# Patient Record
Sex: FEMALE | Race: WHITE | NOT HISPANIC OR LATINO | Employment: OTHER
[De-identification: names, ages, dates, MRNs, and addresses within clinical notes are randomized per-mention and may not be internally consistent; named-entity substitution may affect disease eponyms.]

---

## 2018-05-18 ENCOUNTER — TRANSCRIBE ORDERS (OUTPATIENT)
Dept: SCHEDULING | Age: 68
End: 2018-05-18

## 2018-05-18 DIAGNOSIS — M54.12 BRACHIAL NEURITIS: Primary | ICD-10-CM

## 2018-05-19 ENCOUNTER — HOSPITAL ENCOUNTER (OUTPATIENT)
Dept: RADIOLOGY | Facility: HOSPITAL | Age: 68
Discharge: HOME | End: 2018-05-19
Attending: PHYSICAL MEDICINE & REHABILITATION
Payer: MEDICARE

## 2018-05-19 DIAGNOSIS — M54.12 BRACHIAL NEURITIS: ICD-10-CM

## 2018-07-16 ENCOUNTER — TRANSCRIBE ORDERS (OUTPATIENT)
Dept: SCHEDULING | Facility: REHABILITATION | Age: 68
End: 2018-07-16

## 2018-07-16 DIAGNOSIS — M54.2 CERVICALGIA: Primary | ICD-10-CM

## 2018-07-18 ENCOUNTER — HOSPITAL ENCOUNTER (OUTPATIENT)
Dept: PHYSICAL THERAPY | Facility: REHABILITATION | Age: 68
Setting detail: THERAPIES SERIES
Discharge: HOME | End: 2018-07-18
Attending: PHYSICAL MEDICINE & REHABILITATION
Payer: MEDICARE

## 2018-07-18 VITALS — DIASTOLIC BLOOD PRESSURE: 81 MMHG | HEART RATE: 76 BPM | SYSTOLIC BLOOD PRESSURE: 145 MMHG

## 2018-07-18 DIAGNOSIS — M54.2 CERVICALGIA: ICD-10-CM

## 2018-07-18 PROCEDURE — 97162 PT EVAL MOD COMPLEX 30 MIN: CPT | Mod: GP

## 2018-07-18 PROCEDURE — G8982 BODY POS GOAL STATUS: HCPCS | Mod: GP,CJ

## 2018-07-18 PROCEDURE — G8981 BODY POS CURRENT STATUS: HCPCS | Mod: GP,CK

## 2018-07-18 RX ORDER — AMITRIPTYLINE HYDROCHLORIDE 10 MG/1
10 TABLET, FILM COATED ORAL NIGHTLY
COMMUNITY
End: 2020-12-27

## 2018-07-18 RX ORDER — ASPIRIN 325 MG
325 TABLET ORAL DAILY
COMMUNITY
End: 2018-10-24 | Stop reason: ALTCHOICE
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
## 2018-07-25 ENCOUNTER — HOSPITAL ENCOUNTER (OUTPATIENT)
Dept: PHYSICAL THERAPY | Facility: REHABILITATION | Age: 68
Setting detail: THERAPIES SERIES
Discharge: HOME | End: 2018-07-25
Attending: PHYSICAL MEDICINE & REHABILITATION
Payer: MEDICARE

## 2018-07-25 DIAGNOSIS — M54.2 CERVICALGIA: Primary | ICD-10-CM

## 2018-07-25 PROCEDURE — 97112 NEUROMUSCULAR REEDUCATION: CPT | Mod: GP

## 2018-07-25 PROCEDURE — 97530 THERAPEUTIC ACTIVITIES: CPT | Mod: GP

## 2018-07-26 NOTE — PROGRESS NOTES
Patient: Ema Black  Location: [facility]    MRN: [id number]  Today's date: 7/25/2018          Therapy Pain/Vitals - 07/25/18 2000        Pain/Comfort/Sleep    Presence of Pain complains of pain/discomfort    Preferred Pain Scale number (Numeric Rating Pain Scale)    Pain Body Location - Side Right    Pain Body Location Neck, upper trapezius    Pain Rating (0-10): Rest --   2    Pain Rating (0-10): Activity 2                    PT Treatment Summary - 07/25/18 1600        Session Details    Document Type daily treatment    Mode of Treatment individual therapy;physical therapy    Patient/Family Observations Pt eports generalized soreness after having to look to the right for a long time. Pt reports going to modifed yoga class yesterday.        Time Calculation    Start Time 1600    Stop Time 1700    Time Calculation (min) 60 min       Range of Motion (ROM)    Comment, General Range of Motion Passive C/S ROM w emphasis on rotation and L lat flexion. STM R upper trapezius, levator scap, supraspinatus, paraspinals       Manual Muscle Testing (MMT)    Comment C/S flex endurance: 30 sec; ext endurance: 30 sec        Core Strengthening Therapeutic Exercise    Comment C/S retract 2x5 w tactile cues, cat/dog x 5; neutral spine in Qped; alt shoulder flexion 5B, upper trunk rot/thread the needle 3xB in Qped; c/s rot w towel 3x 10 sec B        UE Tyler Eliminated Therapeutic Exercise    Exercise(s) Performed shoulder external rotation    Sets/Reps Detail 2x10, 1#    Comment scap depression/ret before Er; tactile cues       UE Supine Therapeutic Exercise    Exercise(s) Performed shoulder external rotation    Sets/Reps Detail 2x10    Comment B ER w OTB       Aerobic Exercise Activity    Exercise(s) Performed arm bike    Time 3    Comment alt dir q min       PT Weekly Outcome Summary    Weekly Outcome Statement Tx emphasis on increasing c/s ROM, activating scapular depressors and maintaining  neutral spine in supine and Qped. HEP issued and reviewed for S/L ER and B ER w OT in supine. Pt is pleasand and receptive to suggestions but speaks incessantly and requires redirection to task. No pain after tx completed.    Recommendations Progress UBE and q ped TE.                         Education provided this session: HEP issued and reviewed for S/L ER and B ER w OT in supine.

## 2018-07-30 ENCOUNTER — HOSPITAL ENCOUNTER (OUTPATIENT)
Dept: PHYSICAL THERAPY | Facility: REHABILITATION | Age: 68
Setting detail: THERAPIES SERIES
Discharge: HOME | End: 2018-07-30
Attending: PHYSICAL MEDICINE & REHABILITATION
Payer: MEDICARE

## 2018-07-30 DIAGNOSIS — M54.2 CERVICALGIA: Primary | ICD-10-CM

## 2018-07-30 PROCEDURE — 97112 NEUROMUSCULAR REEDUCATION: CPT | Mod: GP

## 2018-07-30 PROCEDURE — 97110 THERAPEUTIC EXERCISES: CPT | Mod: GP

## 2018-07-31 NOTE — PROGRESS NOTES
"Patient: Ema Black  Location: [facility]    MRN: [id number]  Today's date: 7/30/2018                PT Treatment Summary - 07/30/18 1300        Session Details    Document Type daily treatment    Mode of Treatment individual therapy;physical therapy    Patient/Family Observations Pt reports neck and B hip tightness after sitting in folding lawn chair for concert in the park x 2 hours. \"I was a little sore after the last visit. I expect that.\" Pt denies UE paresthesias.        Time Calculation    Start Time 1300    Stop Time 1350    Time Calculation (min) 50 min       Range of Motion (ROM)    Comment, General Range of Motion PROM w emphasis on endrange mobility; gentle STM<8 min to limit compensatory overuse.       UE [place] Eliminated Therapeutic Exercise    Exercise(s) Performed shoulder external rotation    Sets/Reps Detail 2x10 1#    Comment scap depression/retraction before ER       UE Supine Therapeutic Exercise    Exercise(s) Performed shoulder external rotation    Sets/Reps Detail 2x15    Comment B ER w PTB; PNF D1/2 OTB 10B        Aerobic Exercise Activity    Exercise(s) Performed arm bike    Time 5    Comment alt dir q min       PT Weekly Outcome Summary    Weekly Outcome Statement Pt requires near constant redirection due to speaking incessantly. Tx emphasis on activatin scapular depressors versus scapular elevators. Demonstrates midrange PNF in supine with fair demo. Needs additional reinforcement for form and maintaining C/S retraction. Vague report of feeling 'ok' after tx. Pain level requested. Pt deferred. \"I am ok..\"    Recommendations Abbreviated tx 2/2 pt going to job fair. When requiring <= min cues, issue PNF patterns. D1/2.                         Education provided this session: activating scapular depressors/muscle re-education in supine.        "

## 2018-08-06 ENCOUNTER — HOSPITAL ENCOUNTER (OUTPATIENT)
Dept: PHYSICAL THERAPY | Facility: REHABILITATION | Age: 68
Setting detail: THERAPIES SERIES
Discharge: HOME | End: 2018-08-06
Attending: PHYSICAL MEDICINE & REHABILITATION
Payer: MEDICARE

## 2018-08-06 DIAGNOSIS — M54.2 CERVICALGIA: Primary | ICD-10-CM

## 2018-08-06 PROCEDURE — 97140 MANUAL THERAPY 1/> REGIONS: CPT | Mod: GP

## 2018-08-06 PROCEDURE — 97112 NEUROMUSCULAR REEDUCATION: CPT | Mod: GP

## 2018-08-06 NOTE — PROGRESS NOTES
"Patient: Ema Black  Location: [facility]    MRN: [id number]  Today's date: 8/6/2018          Therapy Pain/Vitals - 08/06/18 1400        Pain/Comfort/Sleep    Presence of Pain complains of pain/discomfort    Preferred Pain Scale number (Numeric Rating Pain Scale)    Pain Body Location - Side Right    Pain Body Location neck    Pain Rating (0-10): Rest --   0    Pain Rating (0-10): Activity --   0 now; R sided neck pain over weekend.                    PT Treatment Summary - 08/06/18 1300        Session Details    Document Type daily treatment    Mode of Treatment individual therapy;physical therapy    Patient/Family Observations Pt reports increased neck pain for a few days after last visit but is now able to brush hair and back up in car without difficulty. Denies neck pain at present. \"I feel stiff.\"       Time Calculation    Start Time 1305    Stop Time 1405    Time Calculation (min) 60 min       ROM: Neck    AROM Deficit: Left Neck Rotation 65    AROM Deficit: Right Neck Rotation 64       UE Electra Eliminated Therapeutic Exercise    Sets/Reps Detail 10    Comment T/S rotation in sidelyin w eyes following hand        UE Seated Therapeutic Exercise    Comment T/S ext stretch 3x w P T overpressure; 5x over chair w hands behind neck (midrange only); UT stretch 3x 10 sec B       UE Standing Therapeutic Exercise    Comment Leaning against wall in modified squat, TrA w B ER OTB  w neutral head position 2x5       UE Supine Therapeutic Exercise    Exercise(s) Performed shoulder external rotation    Comment On noodle, 2 towels under arms, B ER w PTB 2x10; same TE w TrA 10B; D1/2 2x10 B UE no resistance        Aerobic Exercise Activity    Exercise(s) Performed arm bike    Time 5   5:30    Comment L1 alt dir q min       PT Weekly Outcome Summary    Weekly Outcome Statement Improved demo of PNF D1/2, full range, without difficulty in supine. Firm endfeel noted during passive C/S rot and " "lateral flexion. Trial 1/2 foam roll (can use pool noodle or towels at home) to increase extension. Tx emphasis on engaging core in supine and standing while maintaining neutral spine and depression scapular stabilizers. Pt is motivated and agreeable to all suggestions. 4 degree inc in C/S rotation yet patient reports a \"really big difference\". Pt reports R UT pain 2-3/10 after tx completed.    Recommendations Issue HEP and review next visit.                         Education provided this session: engaging core in multiple planes while doing other exercises. Will benefit from reinforcement.        "

## 2018-08-13 ENCOUNTER — HOSPITAL ENCOUNTER (OUTPATIENT)
Dept: PHYSICAL THERAPY | Facility: REHABILITATION | Age: 68
Setting detail: THERAPIES SERIES
Discharge: HOME | End: 2018-08-13
Attending: PHYSICAL MEDICINE & REHABILITATION
Payer: MEDICARE

## 2018-08-13 DIAGNOSIS — M54.2 CERVICALGIA: Primary | ICD-10-CM

## 2018-08-13 PROCEDURE — 97112 NEUROMUSCULAR REEDUCATION: CPT | Mod: GP

## 2018-08-13 PROCEDURE — 97110 THERAPEUTIC EXERCISES: CPT | Mod: GP

## 2018-08-13 PROCEDURE — 97140 MANUAL THERAPY 1/> REGIONS: CPT | Mod: GP

## 2018-08-13 NOTE — PROGRESS NOTES
"Patient: Ema Black  Location: [facility]    MRN: [id number]  Today's date: 8/13/2018 08/13/18 1500   Pain/Comfort/Sleep   Presence of Pain complains of pain/discomfort   Preferred Pain Scale number (Numeric Rating Pain Scale)   Pain Body Location - Side Right   Pain Body Location neck   Pain Rating (0-10): Rest 5   Pain Rating (0-10): Activity 5               PT Treatment Summary - 08/13/18 1300        Session Details    Document Type daily treatment    Mode of Treatment individual therapy;physical therapy    Patient/Family Observations \"I did too much this weekend again. I cleaned the carpets with . Neck pain is not improving but radiating symptoms have dissipated.\"       Time Calculation    Start Time 1300    Stop Time 1400    Time Calculation (min) 60 min       Range of Motion (ROM)    Comment, General Range of Motion STM B UT, posterior neck; PROM neck, seated T/S ext mobilization 5x;        Core Strengthening Therapeutic Exercise    Comment C/S retraction 10 w 3 sec hold; TrA w mini squat B ER OTB 2x10;  UT stretcch 3x20 sec B;        UE [place] Eliminated Therapeutic Exercise    Sets/Reps Detail 10    Comment T/S rotation w eyes following hand 1#       UE Seated Therapeutic Exercise    Comment T/S ext stretch 3x w Pt assist; 5x over chair w hands behind neck (midrange only); UT stretch 3x 10 sec B       UE Standing Therapeutic Exercise    Exercise(s) Performed shoulder extension    Sets/Reps Detail 2x10 w OTB    Comment Overhead pull down,B w band on uppermost nail on Tband tree       Aerobic Exercise Activity    Exercise(s) Performed arm bike    Time 8    Comment L1 alt dir q 2 min       PT Weekly Outcome Summary    Weekly Outcome Statement 2/10 neck pain. Min vc's for form with ther ex to activate scapular stabilizers versus using UT's. HEP issued and reviewed. Sustained C/S ext 30 sec; sustained c/s flex 15 seconds.     Recommendations Review Qped " shoulder flexion, birddog, B shoulder ext w OTB, as well as B shoulder ext pull down.                   Education provided this session: HEP prone shoulder ext 3x/wk, prone horiz abd 3xwk, qped shoulder flex 2x/wk, Qped birddog 2x/wk

## 2018-09-17 ENCOUNTER — HOSPITAL ENCOUNTER (OUTPATIENT)
Dept: PHYSICAL THERAPY | Facility: REHABILITATION | Age: 68
Setting detail: THERAPIES SERIES
Discharge: HOME | End: 2018-09-17
Attending: PHYSICAL MEDICINE & REHABILITATION
Payer: MEDICARE

## 2018-09-17 DIAGNOSIS — M54.2 CERVICALGIA: Primary | ICD-10-CM

## 2018-09-17 PROCEDURE — 97110 THERAPEUTIC EXERCISES: CPT | Mod: GP

## 2018-09-17 PROCEDURE — 97140 MANUAL THERAPY 1/> REGIONS: CPT | Mod: GP

## 2018-09-18 NOTE — PROGRESS NOTES
"Patient: Ema Black  Location: [facility]    MRN: [id number]  Today's date: 9/17/2018          Therapy Pain/Vitals - 09/17/18 1800        Pain/Comfort/Sleep    Presence of Pain complains of pain/discomfort    Preferred Pain Scale number (Numeric Rating Pain Scale)    Pain Body Location - Side Right    Pain Body Location neck    Pain Rating (0-10): Rest 5    Pain Rating (0-10): Activity 5          Prior Living Environment            Prior Level of Function              PT Treatment Summary - 09/17/18 1800        Session Details    Document Type re-evaluation    Mode of Treatment individual therapy;physical therapy    Patient/Family Observations NDI: 28% impaired. \"I was doing pretty good until this weekend. It was very busy weekend with family events (new baby, wedding, ill family member).  I have been doing my exercises and I can feel myself using muscles I never used before.\"       Time Calculation    Start Time 1800    Stop Time 1900    Time Calculation (min) 60 min       Range of Motion (ROM)    Comment, General Range of Motion PROM C/S, T/S PA mob G2-3; C/S G2 PA; T/S ext mobilization 3x       ROM: Neck    AROM Deficit: Neck Flexion 60    AROM Deficit: Neck Extension 63    AROM Deficit: Left Neck Lateral Flexion 20    AROM Deficit: Right Neck Lateral Flexion 36    AROM Deficit: Left Neck Rotation 67    AROM Deficit: Right Neck Rotation 72       ROM: Left Shoulder    AROM Deficit: Flexion 160    PROM Deficit: Flexion 170    AROM Deficit: Extension 70    PROM Deficit: Extension 80    AROM Deficit: ABduction 162    PROM Deficit: ABduction 180    AROM Deficit: External Rotation 80    PROM Deficit: External Rotation 90    AROM Deficit: Internal Rotation --   T9       ROM: Right Shoulder    AROM Deficit: Flexion 167    PROM Deficit: Flexion 180    AROM Deficit: Extension 80    PROM Deficit: Extension --   WFL    AROM Deficit: ABduction 155    PROM Deficit: ABduction 170    AROM " Deficit: External Rotation 84    AROM Deficit: Internal Rotation T8       Manual Muscle Testing (MMT)    Comment Sustained: C/S ext 30 sec, flex 23 sec       Core Strengthening Therapeutic Exercise    Comment supine on 1/2 roll B shoulder flexion stretch 5x 30 sec; S/L T/S rot w horizontal shoulder abd 10B; BRIAN w c/s ext 5; BRIAN w C/S rot 5B;        Aerobic Exercise Activity    Exercise(s) Performed arm bike    Time 5    Comment L1 alt dir q min       PT Clinical Impression    Plan For Care Reviewed: Physical Therapy patient voices agreement with PT plan for care    System Pathology/Pathophysiology Noted musculoskeletal    Functional Limitations in Following Categories (PT Eval) work;community/leisure    Disability: Inability to Perform Actions/Activities of Required Roles work;community/leisure    Rehab Potential/Prognosis good, to achieve stated therapy goals    PT Frequency of Treatment --   1x/wk    Estimated Length of Stay 3 weeks    Daily Outcome Statement Ema has progressed well despite interruption in tx due to work schedule/therapist availability. Improved C/S ROM, improved activity tolerance, improved NDI, and decreased pain intensity and frequency. Pt reports partially return to function. Pt ganga benefit from continuing PT to meet unmet goals. Pt is agge       PT Weekly Outcome Summary    Functional Goal Overall Progress progressing toward functional goals as expected                        Education provided this session: pt encouraged to purchase foam noodle from [employer].         Goals     • PT STG/LTG Neck            Long Term Goals Time Frame Result Comment/Progress   Pt will increase cervical ROM >/= 10 degrees into cervical flexion, extension, lateral flexion, rotation 4 weeks partially met Met for flex, ext, R lat flex, R rot   Pt will demonstrate cervical extensor and flexor endurance > = 30 seconds without symptoms 8 weeks partially met Met for ext   Pt will improve NDI<=30% impairment 4  weeks met 28% impaired   Pt will increase cervical ROM >/= 15 from initial eval degrees into cervical flexion, extension, lateral flexion, rotation 8 weeks partially met    Pt will improve NDI </= 25 % impairment  8 weeks ongoing    Pt will have centralization of radicular symptoms by >/= 100%  8 weeks ongoing Referred pain R UT, intermittent   Pt will report proper ergonomic set up of workstation to minimize difficulty when using a mouse 8 weeks met    Pt will report increase driving tolerance >=30 minutes without onset of R UT spasms 8 weeks met

## 2018-09-24 ENCOUNTER — HOSPITAL ENCOUNTER (OUTPATIENT)
Dept: PHYSICAL THERAPY | Facility: REHABILITATION | Age: 68
Setting detail: THERAPIES SERIES
Discharge: HOME | End: 2018-09-24
Attending: PHYSICAL MEDICINE & REHABILITATION
Payer: MEDICARE

## 2018-09-24 DIAGNOSIS — M54.2 CERVICALGIA: Primary | ICD-10-CM

## 2018-09-24 PROCEDURE — 97140 MANUAL THERAPY 1/> REGIONS: CPT | Mod: GP

## 2018-09-24 PROCEDURE — 97112 NEUROMUSCULAR REEDUCATION: CPT | Mod: GP

## 2018-09-24 NOTE — PROGRESS NOTES
Patient: Ema Black  Location: [facility]    MRN: [id number]  Today's date: 9/24/2018          Therapy Pain/Vitals - 09/24/18 1800        Pain/Comfort/Sleep    Presence of Pain denies                    PT Treatment Summary - 09/24/18 1800        Range of Motion (ROM)    Comment, General Range of Motion PROM C/S; STM B UT, SOR, scalenes, levator, masseter, hyoid; PA mob G3 T1-12.        Core Strengthening Therapeutic Exercise    Comment supine w towel horizontal T4 ; S/L upper T/S rot 10B; S/L lower T/S rot stretch w 1 LE extended 30 sec B; BRIAN w c/s ext 10; BRIAN C/S rot 3x B; seated SCM stretch 3x 20 sec B       Aerobic Exercise Activity    Exercise(s) Performed arm bike    Time 5    Comment L1 alt dir q min       PT Weekly Outcome Summary    Functional Goal Overall Progress progressing toward functional goals as expected    Weekly Outcome Statement Tx emphasis on minimizing neck muscle tension and adaptive shortening w STM well as increasing active C/S and T/S mobility. Good demo of all activies with need for reinforcement of seated SCM stretch. No c/o pain or fatigue after tx completed.    Recommendations Cont 1/2 kneel T/S AROM, BRIAN w c/s ext, and SCM stretch. Send HEP via email. May benefit from being seen by Sherin Crane PT to address longstanding TMD issues.                         Education provided this session: new stretches to address spinal mobility including 1/2 kneel T/S rotation, BRIAN w c/s ext, and SCM stretch.Will benefit from reinforcement via HEP or short video on phone to ensure appropriate form during activities.

## 2018-10-01 ENCOUNTER — HOSPITAL ENCOUNTER (OUTPATIENT)
Dept: PHYSICAL THERAPY | Facility: REHABILITATION | Age: 68
Setting detail: THERAPIES SERIES
Discharge: HOME | End: 2018-10-01
Attending: PHYSICAL MEDICINE & REHABILITATION
Payer: MEDICARE

## 2018-10-01 DIAGNOSIS — M54.2 CERVICALGIA: Primary | ICD-10-CM

## 2018-10-01 PROCEDURE — 97112 NEUROMUSCULAR REEDUCATION: CPT | Mod: GP

## 2018-10-01 PROCEDURE — 97140 MANUAL THERAPY 1/> REGIONS: CPT | Mod: GP

## 2018-10-02 ENCOUNTER — TRANSCRIBE ORDERS (OUTPATIENT)
Dept: SCHEDULING | Facility: REHABILITATION | Age: 68
End: 2018-10-02

## 2018-10-02 DIAGNOSIS — S03.00XA TMJ (DISLOCATION OF TEMPOROMANDIBULAR JOINT): Primary | ICD-10-CM

## 2018-10-02 NOTE — PROGRESS NOTES
"Patient: Ema Black  Location: [facility]    MRN: [id number]  Today's date: 10/1/2018        Prior Living Environment            Prior Level of Function              PT Treatment Summary - 10/01/18 1800        Session Details    Document Type daily treatment    Mode of Treatment physical therapy;individual therapy    Patient/Family Observations Pt reports feeling good today. \"I woke up feeling terrible. I went back to sleep and then had a decent day.\" Pt reports not working. She is between temp jobs.        Time Calculation    Start Time 1800    Stop Time 1900    Time Calculation (min) 60 min       Range of Motion (ROM)    Comment, General Range of Motion PROM C/S; STM B UT, SO, HYOID, SO, UT; seated T/S ext mobilization 5x       Core Strengthening Therapeutic Exercise    Comment  S/L upper T/S rot 10B; 2# S/L lower T/S rot stretch w 1 LE extended 30 sec B; BRIAN w c/s ext 10;; seated SCM stretch 3x20 sec; tall kneel to 1/2 kneel 5B;        UE Prone Therapeutic Exercise    Comment B prone horiz abd, B prone scaption 2x10 (HEP)       Aerobic Exercise Activity    Exercise(s) Performed arm bike    Time 7    Comment L1 alt dir q min       PT Weekly Outcome Summary    Functional Goal Overall Progress progressing toward functional goals as expected    Weekly Outcome Statement Tx emphasis on ccontinuing exercise as instructed to improve mobility and strength. Pt denies pain tx completed.                         Education provided this session: Pt encouraged to cont yoga and add current exercises to maintain or improve current mobility. Pt verbalized understanding but will benefit from HEP to reinforce.       "

## 2018-10-08 ENCOUNTER — HOSPITAL ENCOUNTER (OUTPATIENT)
Dept: PHYSICAL THERAPY | Facility: REHABILITATION | Age: 68
Setting detail: THERAPIES SERIES
Discharge: HOME | End: 2018-10-08
Attending: PHYSICAL MEDICINE & REHABILITATION
Payer: MEDICARE

## 2018-10-08 DIAGNOSIS — M54.2 CERVICALGIA: Primary | ICD-10-CM

## 2018-10-08 PROCEDURE — 97140 MANUAL THERAPY 1/> REGIONS: CPT | Mod: GP

## 2018-10-08 PROCEDURE — 97110 THERAPEUTIC EXERCISES: CPT | Mod: GP

## 2018-10-09 NOTE — PROGRESS NOTES
Patient: Ema Black  Location: [facility]    MRN: [id number]  Today's date: 10/8/2018          Therapy Pain/Vitals - 10/08/18 1700        Pain/Comfort/Sleep    Presence of Pain complains of pain/discomfort    Preferred Pain Scale number (Numeric Rating Pain Scale)    Pain Body Location - Side Right    Pain Body Location - Orientation posterior    Pain Body Location neck    Pain Rating (0-10): Rest 4                PT Treatment Summary - 10/08/18 1700        Session Details    Document Type daily treatment    Mode of Treatment individual therapy;physical therapy    Patient/Family Observations Pain radiates from R UT to top of head. No jaw pain. Pt attempted to wean from Amitriptyline last week and pain worsened terribly. (+) migraines and inability to sleep. Resumed taking Amitriptyline as prescribed.        Time Calculation    Start Time 1704    Stop Time 1800    Time Calculation (min) 56 min       Range of Motion (ROM)    Comment, General Range of Motion PROM C/S; STM B UT, HYOID, SO, SCALENES, LEVATOR; PA mobilization  G3 T/3; S/L scapular mobilization in all planes; IASTM R UT        Core Strengthening Therapeutic Exercise    Comment BRIAN, C/S ext x 10; TPR w tennis ball/pecs in doorway; C/S rot w towel 3x10 sec; qped cat/dog 10; BRIAN w C/S ext 5       UE Prone Therapeutic Exercise    Sets/Reps Detail 2x10    Comment B shoulder horiz a b       Aerobic Exercise Activity    Exercise(s) Performed arm bike    Time 6    Comment alt dir q min       PT Weekly Outcome Summary    Functional Goal Overall Progress progressing toward functional goals as expected    Weekly Outcome Statement (+) R UT spasm improved with trigger point noted proximal to adhesion in R UT muscle belly. R scapula inf angle protracted compared to L. HEP issued and reviewed w fair demo. Dec pain after tx completed.     Recommendations Review HEP (C/S ext while BRIAN, sidelying horiz shoulder abd/add, qped thread the  needle).                         Education provided this session. See the Patient Education summary report for full details.       Goals     None

## 2018-10-10 ENCOUNTER — HOSPITAL ENCOUNTER (OUTPATIENT)
Dept: PHYSICAL THERAPY | Facility: REHABILITATION | Age: 68
Setting detail: THERAPIES SERIES
Discharge: HOME | End: 2018-10-10
Attending: DENTIST
Payer: MEDICARE

## 2018-10-10 DIAGNOSIS — S03.00XA TMJ (DISLOCATION OF TEMPOROMANDIBULAR JOINT): ICD-10-CM

## 2018-10-10 DIAGNOSIS — S03.00XA DISLOCATION OF TEMPOROMANDIBULAR JOINT, INITIAL ENCOUNTER: ICD-10-CM

## 2018-10-10 PROCEDURE — 97162 PT EVAL MOD COMPLEX 30 MIN: CPT | Mod: GP

## 2018-10-10 PROCEDURE — G8982 BODY POS GOAL STATUS: HCPCS | Mod: GP,CI

## 2018-10-10 PROCEDURE — G8981 BODY POS CURRENT STATUS: HCPCS | Mod: GP,CJ
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
## 2018-10-11 PROCEDURE — G8982 BODY POS GOAL STATUS: HCPCS | Mod: GP,CI

## 2018-10-11 PROCEDURE — G8981 BODY POS CURRENT STATUS: HCPCS | Mod: GP,CJ
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
## 2018-10-15 ENCOUNTER — HOSPITAL ENCOUNTER (OUTPATIENT)
Dept: PHYSICAL THERAPY | Facility: REHABILITATION | Age: 68
Setting detail: THERAPIES SERIES
Discharge: HOME | End: 2018-10-15
Attending: PHYSICAL MEDICINE & REHABILITATION
Payer: MEDICARE

## 2018-10-15 DIAGNOSIS — M54.2 CERVICALGIA: Primary | ICD-10-CM

## 2018-10-17 NOTE — PROGRESS NOTES
"Referring Provider: By co-signing this Plan of Care (POC), you agree with the planned services and interventions recommended by the therapist.       PT RE-EVALUATION FOR DISCHARGE/OUTPATIENT THERAPY    Patient: Ema Black   MRN: [id number]  : 1950 68 y.o.  Referring Physician: Fidencio Rivers MD  Date of Visit: 10/16/18      Diagnosis:   1. Cervicalgia        Chief Complaints:   Chief Complaint   Patient presents with   • Dec ROM     C/S, T/S   • Decreased Endurance     2/2 upper spine tightness   • Pain      R UT and neck pain                 PT Treatment Summary - 10/15/18 1700        Session Details    Document Type discharge evaluation    Mode of Treatment individual therapy;physical therapy    Patient/Family Observations NDI: 28% impaired. \"I have a spasm in my right shoulder.\"  Bad HA's in the last few weeks. Transiert. No HA now.        Time Calculation    Start Time 1700    Stop Time 1800    Time Calculation (min) 60 min       General Information    Patient Profile Reviewed? yes       Range of Motion (ROM)    Comment, General Range of Motion PROM C/S; PA mob G3 T/S; STM  R UT, levator, rhomboids, SS/IS       ROM: Neck    AROM Deficit: Neck Flexion 43   dec 17 from last assessment    AROM Deficit: Neck Extension 58   dec 5 since last reassessment    AROM Deficit: Left Neck Lateral Flexion 25   inc 5 degrees    AROM Deficit: Right Neck Lateral Flexion --    AROM Deficit: Left Neck Rotation 66   dec 1 degree    AROM Deficit: Right Neck Rotation 68   inc 14 degrees       Core Strengthening Therapeutic Exercise    Comment C/S ret 10 x 5 sec; C/S flex 30 sec; C/S ext in BRIAN 10; cat/cow 10; birddog 5B; Qped thread the needle 5B; C/S rot w towel 3x w 10 sec hold, B; C/S ext w towel roll 3x5 sec        UE Prone Therapeutic Exercise    Sets/Reps Detail 2x10    Comment prone horiz abd, prone scap        Aerobic Exercise Activity    Exercise(s) Performed arm bike    Time 7    Comment L1, retro       PT " Clinical Impression    Plan For Care Reviewed: Physical Therapy patient voices agreement with PT plan for care    System Pathology/Pathophysiology Noted musculoskeletal    Daily Outcome Statement Ema reports variable intensity and frequency of neck pain, R UT pain, and HA's. She is independent and compliant w current HEP's. (+) TTP R UT with possible lipoma. Pt reports feeling better after doing stretches and manual techniques. Limited T/S mobility T2-12. s       PT Weekly Outcome Summary    Functional Goal Overall Progress progressing toward functional goals slower than expected                   Education provided this session. See the Patient Education summary report for full detail      Goals:  Goals Addressed     • PT STG/LTG Neck                  Long Term Goals Time Frame Result Comment/Progress   Pt will increase cervical ROM >/= 10 degrees into cervical flexion, extension, lateral flexion, rotation 4 weeks partially met    Pt will demonstrate cervical extensor and flexor endurance > = 30 seconds without symptoms 8 weeks met    Pt will improve NDI<=30% impairment 4 weeks met 28% impaired   Pt will increase cervical ROM >/= 15 from initial eval degrees into cervical flexion, extension, lateral flexion, rotation 8 weeks partially met    Pt will improve NDI </= 25 % impairment  8 weeks unmet 28% impaired   Pt will have centralization of radicular symptoms by >/= 100%  8 weeks unmet Referred pain R UT, intermittent   Pt will report proper ergonomic set up of workstation to minimize difficulty when using a mouse 8 weeks met    Pt will report increase driving tolerance >=30 minutes without onset of R UT spasms 8 weeks met                        Plan: Discharge skilled PT fo neck. Cont current HEP.

## 2018-10-19 ENCOUNTER — HOSPITAL ENCOUNTER (OUTPATIENT)
Dept: PHYSICAL THERAPY | Facility: REHABILITATION | Age: 68
Setting detail: THERAPIES SERIES
Discharge: HOME | End: 2018-10-19
Attending: PHYSICAL MEDICINE & REHABILITATION
Payer: MEDICARE

## 2018-10-19 DIAGNOSIS — M54.2 CERVICALGIA: Primary | ICD-10-CM

## 2018-10-19 DIAGNOSIS — S03.00XA DISLOCATION OF TEMPOROMANDIBULAR JOINT, INITIAL ENCOUNTER: ICD-10-CM

## 2018-10-19 PROCEDURE — 97010 HOT OR COLD PACKS THERAPY: CPT | Mod: GP

## 2018-10-19 PROCEDURE — 97112 NEUROMUSCULAR REEDUCATION: CPT | Mod: GP

## 2018-10-19 PROCEDURE — 97140 MANUAL THERAPY 1/> REGIONS: CPT | Mod: GP

## 2018-10-19 NOTE — PROGRESS NOTES
Patient: Ema Black  Location: [facility]    MRN: [id number]  Today's date: 10/19/2018          Therapy Pain/Vitals - 10/19/18 1100        Pain/Comfort/Sleep    Pain Charting Type Pain Assessment    Presence of Pain complains of pain/discomfort    Preferred Pain Scale number (Numeric Rating Pain Scale)    Pain Body Location - Side Right    Pain Body Location - Orientation upper    Pain Rating (0-10): Rest 3    Pain Rating (0-10): Activity 2          Prior Living Environment            Prior Level of Function              PT Treatment Summary - 10/19/18 1100        Session Details    Document Type daily treatment    Mode of Treatment individual therapy;physical therapy    Patient/Family Observations 1st tx       Time Calculation    Start Time 1105    Stop Time 1200    Time Calculation (min) 55 min       Neuromuscular Re-education    Comment TMJ kinematics in mirror: dental midlines> L and R lateral excursion, guided opening with tongue on rugae - decreasing deviation to either side; discussed postural re-ed       General Head/Neck/Trunk Assessment    Comment, ROM: Head/Neck/Trunk Man tx: STM To B SO's and scal/SCM's, UT's, internal mom, infrahyoids and suprahyoids       UE Seated Therapeutic Exercise    Comment seated C/s rotation with gentle passive OP, added hand behind back, B       Hot Packs Treatment    Comment MHP c/s x 10 min       PT Weekly Outcome Summary    Weekly Outcome Statement init tx today.  Tight L>R internal mom, as well as UT areas.    Recommendations assess response to tx          Pain Assessment/Intervention  Pain Charting Type: Pain Assessment             Education provided this session. Posture re-ed, TMJ kinematics in mirror.  Goals     None

## 2018-10-24 ENCOUNTER — APPOINTMENT (EMERGENCY)
Dept: RADIOLOGY | Facility: HOSPITAL | Age: 68
End: 2018-10-24
Payer: MEDICARE

## 2018-10-24 ENCOUNTER — HOSPITAL ENCOUNTER (EMERGENCY)
Facility: HOSPITAL | Age: 68
Discharge: HOME | End: 2018-10-24
Attending: EMERGENCY MEDICINE
Payer: MEDICARE

## 2018-10-24 VITALS
RESPIRATION RATE: 18 BRPM | TEMPERATURE: 97.3 F | WEIGHT: 158 LBS | BODY MASS INDEX: 26.33 KG/M2 | HEIGHT: 65 IN | DIASTOLIC BLOOD PRESSURE: 73 MMHG | OXYGEN SATURATION: 97 % | SYSTOLIC BLOOD PRESSURE: 162 MMHG | HEART RATE: 66 BPM

## 2018-10-24 DIAGNOSIS — R51.9 ACUTE NONINTRACTABLE HEADACHE, UNSPECIFIED HEADACHE TYPE: ICD-10-CM

## 2018-10-24 DIAGNOSIS — R20.2 PARESTHESIAS: Primary | ICD-10-CM

## 2018-10-24 LAB
ALBUMIN SERPL-MCNC: 4.2 G/DL (ref 3.4–5)
ALP SERPL-CCNC: 72 IU/L (ref 35–126)
ALT SERPL-CCNC: 23 IU/L (ref 11–54)
ANION GAP SERPL CALC-SCNC: 8 MEQ/L (ref 3–15)
AST SERPL-CCNC: 24 IU/L (ref 15–41)
BASOPHILS # BLD: 0.06 K/UL (ref 0.01–0.1)
BASOPHILS NFR BLD: 1.1 %
BILIRUB SERPL-MCNC: 0.4 MG/DL (ref 0.3–1.2)
BUN SERPL-MCNC: 16 MG/DL (ref 8–20)
CALCIUM SERPL-MCNC: 8.9 MG/DL (ref 8.9–10.3)
CHLORIDE SERPL-SCNC: 105 MEQ/L (ref 98–109)
CO2 SERPL-SCNC: 23 MEQ/L (ref 22–32)
CREAT SERPL-MCNC: 0.7 MG/DL (ref 0.6–1.1)
DIFFERENTIAL METHOD BLD: NORMAL
EOSINOPHIL # BLD: 0.11 K/UL (ref 0.04–0.36)
EOSINOPHIL NFR BLD: 1.9 %
ERYTHROCYTE [DISTWIDTH] IN BLOOD BY AUTOMATED COUNT: 17 % (ref 11.7–14.4)
GFR SERPL CREATININE-BSD FRML MDRD: >60 ML/MIN/1.73M*2
GLUCOSE SERPL-MCNC: 95 MG/DL (ref 70–99)
HCT VFR BLDCO AUTO: 42.3 % (ref 35–45)
HGB BLD-MCNC: 13.6 G/DL (ref 11.8–15.7)
IMM GRANULOCYTES # BLD AUTO: 0.01 K/UL (ref 0–0.08)
IMM GRANULOCYTES NFR BLD AUTO: 0.2 %
LYMPHOCYTES # BLD: 1.99 K/UL (ref 1.2–3.5)
LYMPHOCYTES NFR BLD: 34.9 %
MCH RBC QN AUTO: 26.7 PG (ref 28–33.2)
MCHC RBC AUTO-ENTMCNC: 32.2 G/DL (ref 32.2–35.5)
MCV RBC AUTO: 83.1 FL (ref 83–98)
MONOCYTES # BLD: 0.54 K/UL (ref 0.28–0.8)
MONOCYTES NFR BLD: 9.5 %
NEUTROPHILS # BLD: 2.99 K/UL (ref 1.7–7)
NEUTS SEG NFR BLD: 52.4 %
NRBC BLD-RTO: 0 %
PDW BLD AUTO: 9.5 FL (ref 9.4–12.3)
PLATELET # BLD AUTO: 235 K/UL (ref 150–369)
POTASSIUM SERPL-SCNC: 3.8 MEQ/L (ref 3.6–5.1)
PROT SERPL-MCNC: 6.9 G/DL (ref 6–8.2)
RBC # BLD AUTO: 5.09 M/UL (ref 3.93–5.22)
SODIUM SERPL-SCNC: 136 MEQ/L (ref 136–144)
TROPONIN I SERPL-MCNC: <0.02 NG/ML
WBC # BLD AUTO: 5.7 K/UL (ref 3.8–10.5)

## 2018-10-24 PROCEDURE — 85025 COMPLETE CBC W/AUTO DIFF WBC: CPT | Performed by: PHYSICIAN ASSISTANT

## 2018-10-24 PROCEDURE — 80053 COMPREHEN METABOLIC PANEL: CPT | Performed by: PHYSICIAN ASSISTANT

## 2018-10-24 PROCEDURE — 36415 COLL VENOUS BLD VENIPUNCTURE: CPT | Performed by: PHYSICIAN ASSISTANT

## 2018-10-24 PROCEDURE — 25800000 HC PHARMACY IV SOLUTIONS: Performed by: PHYSICIAN ASSISTANT

## 2018-10-24 PROCEDURE — 93005 ELECTROCARDIOGRAM TRACING: CPT | Performed by: PHYSICIAN ASSISTANT

## 2018-10-24 PROCEDURE — 3E0337Z INTRODUCTION OF ELECTROLYTIC AND WATER BALANCE SUBSTANCE INTO PERIPHERAL VEIN, PERCUTANEOUS APPROACH: ICD-10-PCS | Performed by: EMERGENCY MEDICINE

## 2018-10-24 PROCEDURE — 84484 ASSAY OF TROPONIN QUANT: CPT | Performed by: PHYSICIAN ASSISTANT

## 2018-10-24 PROCEDURE — 63700000 HC SELF-ADMINISTRABLE DRUG: Performed by: PHYSICIAN ASSISTANT

## 2018-10-24 PROCEDURE — 70450 CT HEAD/BRAIN W/O DYE: CPT

## 2018-10-24 PROCEDURE — 99284 EMERGENCY DEPT VISIT MOD MDM: CPT | Mod: 25

## 2018-10-24 PROCEDURE — 96360 HYDRATION IV INFUSION INIT: CPT

## 2018-10-24 RX ORDER — ACETAMINOPHEN 325 MG/1
650 TABLET ORAL ONCE
Status: COMPLETED | OUTPATIENT
Start: 2018-10-24 | End: 2018-10-24

## 2018-10-24 RX ORDER — ACETAMINOPHEN 650 MG/20.3ML
650 LIQUID ORAL ONCE
Status: DISCONTINUED | OUTPATIENT
Start: 2018-10-24 | End: 2018-10-24

## 2018-10-24 RX ADMIN — ACETAMINOPHEN 650 MG: 325 TABLET, FILM COATED ORAL at 22:27

## 2018-10-24 RX ADMIN — SODIUM CHLORIDE 1000 ML: 9 INJECTION, SOLUTION INTRAVENOUS at 21:32

## 2018-10-24 ASSESSMENT — ENCOUNTER SYMPTOMS
SINUS PRESSURE: 0
SORE THROAT: 0
NUMBNESS: 1
NAUSEA: 0
COUGH: 0
WEAKNESS: 0
EYE PAIN: 0
HEMATURIA: 0
JOINT SWELLING: 0
NECK PAIN: 0
VOMITING: 0
BACK PAIN: 0
PHOTOPHOBIA: 1
FEVER: 0
SHORTNESS OF BREATH: 0
DYSURIA: 0
APPETITE CHANGE: 0
ABDOMINAL PAIN: 0
HEADACHES: 1
FREQUENCY: 0
DIARRHEA: 0
DIZZINESS: 0
CHILLS: 0
LIGHT-HEADEDNESS: 0
RHINORRHEA: 0

## 2018-10-25 LAB
ATRIAL RATE: 66
P AXIS: 57
PR INTERVAL: 162
QRS DURATION: 102
QT INTERVAL: 444
QTC CALCULATION(BAZETT): 465
R AXIS: 31
T WAVE AXIS: 38
VENTRICULAR RATE: 66

## 2018-10-25 NOTE — DISCHARGE INSTRUCTIONS
You were evaluated for headache and facial numbness.  Your blood work and CAT scan unremarkable.  Please continue routine medication as prescribed.  Please follow-up with your primary care provider and urology within 3-5 days.  Please return to the ED for any new or concerning symptoms.

## 2018-10-25 NOTE — NURSING NOTE
Phone call received from patient regarding documentation in the After Visit Summary (AVS).  Based on chart review, discussion with the patient, and noting the subsequent specialists listed on the AVS, this should mention follow-up with neurology, not urology as transcribed.  Pt is aware of the same.
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
## 2018-11-03 ENCOUNTER — TRANSCRIBE ORDERS (OUTPATIENT)
Dept: SCHEDULING | Age: 68
End: 2018-11-03

## 2018-11-03 DIAGNOSIS — R90.82 WHITE MATTER DISEASE: Primary | ICD-10-CM

## 2018-11-05 DIAGNOSIS — S03.00XD DISLOCATION OF TEMPOROMANDIBULAR JOINT, SUBSEQUENT ENCOUNTER: Primary | ICD-10-CM

## 2018-11-05 NOTE — PROGRESS NOTES
PT DISCHARGE NOTE FOR OUTPATIENT THERAPY    Patient: Ema Black   MRN: [id number]  : 1950 68 y.o.  Referring Physician: No ref. provider found  Date of Visit: 18            Diagnosis:   1. Dislocation of temporomandibular joint, subsequent encounter                      Evaluation Assessment and Plan - 18 1524        Evaluation Assessment and Plan    Clinical Assessment SPoke with pt on the phone.  SHe had an episode of facial numbness s/p a dry needling treatment.  SHe is to see a neurologist in about 3 weeks.  She is discharged at this time 2* need for further workup.                          Discharge information for [facility]:    Reason for D/C: Self discharge  Hospitalization during treatment: No  Interrupted for medical reason: No  Skin integrity: Intact

## 2018-11-06 ENCOUNTER — HOSPITAL ENCOUNTER (OUTPATIENT)
Dept: RADIOLOGY | Facility: HOSPITAL | Age: 68
Discharge: HOME | End: 2018-11-06
Attending: NURSE PRACTITIONER
Payer: MEDICARE

## 2018-11-06 DIAGNOSIS — R90.82 WHITE MATTER DISEASE: ICD-10-CM

## 2018-11-08 NOTE — ED ATTESTATION NOTE
The patient was evaluated and managed by the physician assistant / nurse practitioner.     Desmond Summers MD  11/07/18 0150

## 2020-12-27 ENCOUNTER — HOSPITAL ENCOUNTER (EMERGENCY)
Facility: HOSPITAL | Age: 70
Discharge: HOME | End: 2020-12-27
Attending: EMERGENCY MEDICINE
Payer: MEDICARE

## 2020-12-27 ENCOUNTER — APPOINTMENT (EMERGENCY)
Dept: RADIOLOGY | Facility: HOSPITAL | Age: 70
End: 2020-12-27
Attending: EMERGENCY MEDICINE
Payer: MEDICARE

## 2020-12-27 VITALS
RESPIRATION RATE: 18 BRPM | WEIGHT: 139 LBS | OXYGEN SATURATION: 97 % | BODY MASS INDEX: 23.13 KG/M2 | DIASTOLIC BLOOD PRESSURE: 83 MMHG | TEMPERATURE: 98.4 F | SYSTOLIC BLOOD PRESSURE: 167 MMHG

## 2020-12-27 DIAGNOSIS — R07.9 CHEST PAIN, UNSPECIFIED TYPE: Primary | ICD-10-CM

## 2020-12-27 DIAGNOSIS — R06.02 SHORTNESS OF BREATH: ICD-10-CM

## 2020-12-27 LAB
ANION GAP SERPL CALC-SCNC: 12 MEQ/L (ref 3–15)
BASOPHILS # BLD: 0.07 K/UL (ref 0.01–0.1)
BASOPHILS NFR BLD: 1.2 %
BUN SERPL-MCNC: 18 MG/DL (ref 8–20)
CALCIUM SERPL-MCNC: 9.5 MG/DL (ref 8.9–10.3)
CHLORIDE SERPL-SCNC: 101 MEQ/L (ref 98–109)
CO2 SERPL-SCNC: 25 MEQ/L (ref 22–32)
CREAT SERPL-MCNC: 0.7 MG/DL (ref 0.6–1.1)
DIFFERENTIAL METHOD BLD: ABNORMAL
EOSINOPHIL # BLD: 0.11 K/UL (ref 0.04–0.36)
EOSINOPHIL NFR BLD: 1.9 %
ERYTHROCYTE [DISTWIDTH] IN BLOOD BY AUTOMATED COUNT: 14.1 % (ref 11.7–14.4)
GFR SERPL CREATININE-BSD FRML MDRD: >60 ML/MIN/1.73M*2
GLUCOSE SERPL-MCNC: 94 MG/DL (ref 70–99)
HCT VFR BLDCO AUTO: 47.3 % (ref 35–45)
HGB BLD-MCNC: 15.5 G/DL (ref 11.8–15.7)
IMM GRANULOCYTES # BLD AUTO: 0.02 K/UL (ref 0–0.08)
IMM GRANULOCYTES NFR BLD AUTO: 0.3 %
LYMPHOCYTES # BLD: 2.02 K/UL (ref 1.2–3.5)
LYMPHOCYTES NFR BLD: 34 %
MCH RBC QN AUTO: 29.2 PG (ref 28–33.2)
MCHC RBC AUTO-ENTMCNC: 32.8 G/DL (ref 32.2–35.5)
MCV RBC AUTO: 89.1 FL (ref 83–98)
MONOCYTES # BLD: 0.5 K/UL (ref 0.28–0.8)
MONOCYTES NFR BLD: 8.4 %
NEUTROPHILS # BLD: 3.22 K/UL (ref 1.7–7)
NEUTS SEG NFR BLD: 54.2 %
NRBC BLD-RTO: 0 %
PDW BLD AUTO: 9.9 FL (ref 9.4–12.3)
PLATELET # BLD AUTO: 256 K/UL (ref 150–369)
POTASSIUM SERPL-SCNC: 4 MEQ/L (ref 3.6–5.1)
RBC # BLD AUTO: 5.31 M/UL (ref 3.93–5.22)
SODIUM SERPL-SCNC: 138 MEQ/L (ref 136–144)
TROPONIN I SERPL-MCNC: <0.02 NG/ML
WBC # BLD AUTO: 5.94 K/UL (ref 3.8–10.5)

## 2020-12-27 PROCEDURE — 85025 COMPLETE CBC W/AUTO DIFF WBC: CPT | Performed by: EMERGENCY MEDICINE

## 2020-12-27 PROCEDURE — 63700000 HC SELF-ADMINISTRABLE DRUG: Performed by: EMERGENCY MEDICINE

## 2020-12-27 PROCEDURE — 36415 COLL VENOUS BLD VENIPUNCTURE: CPT | Performed by: EMERGENCY MEDICINE

## 2020-12-27 PROCEDURE — 84484 ASSAY OF TROPONIN QUANT: CPT | Performed by: EMERGENCY MEDICINE

## 2020-12-27 PROCEDURE — 71046 X-RAY EXAM CHEST 2 VIEWS: CPT

## 2020-12-27 PROCEDURE — 93005 ELECTROCARDIOGRAM TRACING: CPT | Performed by: EMERGENCY MEDICINE

## 2020-12-27 PROCEDURE — 80048 BASIC METABOLIC PNL TOTAL CA: CPT | Performed by: EMERGENCY MEDICINE

## 2020-12-27 PROCEDURE — 99283 EMERGENCY DEPT VISIT LOW MDM: CPT | Mod: 25

## 2020-12-27 RX ORDER — NAPROXEN SODIUM 220 MG/1
324 TABLET, FILM COATED ORAL ONCE
Status: COMPLETED | OUTPATIENT
Start: 2020-12-27 | End: 2020-12-27

## 2020-12-27 RX ORDER — TRAMADOL HYDROCHLORIDE 50 MG/1
TABLET ORAL
COMMUNITY
Start: 2020-12-10 | End: 2021-03-04

## 2020-12-27 RX ADMIN — ASPIRIN 324 MG: 81 TABLET, CHEWABLE ORAL at 15:35

## 2020-12-27 ASSESSMENT — ENCOUNTER SYMPTOMS
EYE PAIN: 0
CHILLS: 0
SEIZURES: 0
COLOR CHANGE: 1
PALPITATIONS: 0
VOMITING: 0
ARTHRALGIAS: 0
SORE THROAT: 0
SHORTNESS OF BREATH: 1
DYSURIA: 0
COUGH: 0
HEMATURIA: 0
FEVER: 0
BACK PAIN: 0
ABDOMINAL PAIN: 0

## 2020-12-27 NOTE — ED ATTESTATION NOTE
I saw and evaluated the patient.  I reviewed the resident's note and agree with the findings and plan as documented in the note except for my comments if noted below.    76-year-old female presents with central chest pain.  Patient was seen at [facility] the day after the pain started and had a CAT scan which showed no evidence of PE.  Her troponin test was normal.  Patient went to her PCPs office today.  They believe she had costochondritis.  Patient reports she cannot take NSAIDs.  Patient was given sublingual nitros and had no relief.  She had an EKG that they were concerned about abnormalities and referred her here.  Patient reports she had a cath 10 years ago which showed no coronary artery disease.  On exam patient is awake alert in no acute distress.  She has no respiratory distress.  Patient has bilateral costochondral tenderness that reproduces her pain.  Her heart has a regular rate and rhythm.  His skin is dry normal color.  EKG is unchanged from 2 years ago.  Her troponin test is normal.  Her chest x-ray shows no acute disease.  Patient was instructed to try lidocaine patches, Tylenol, heat and cold packs.  Patient was discharged to follow-up with a PCP and cardiology.     Lida Maloney MD  12/27/20 8570

## 2020-12-27 NOTE — ED PROVIDER NOTES
HPI     Chief Complaint   Patient presents with   • Chest Pain       HPI  The patient is a 70-year-old female with past medical history of sarcoma status post removal, who presents to the ED for chest pain and shortness of breath.    Patient states she had a lipoma removed on her neck 6 days ago.  She required general anesthesia for this.  Later that evening while laying in bed she noticed that her hands turned dusky gray and then blue.  This was associated with mild substernal chest pain and shortness of breath.  The pain is nonradiating.  She went to [facility] the next day and had a CT scan of her chest to look for PEs, which was negative for blood clots, but did show bronchiolitis and cardiomegaly.  The patient has continued to have this chest pain, which she states has been constant throughout the course of the week.  The pain is not worse with exertion.  She does still feel slightly short of breath with exertion.  No lightheadedness.  No pain or swelling the legs.  She was at her primary care doctor's office today and had an EKG performed which showed depressions in V5 and 6.  Patient was given 2 sublingual nitros without any change in symptoms.  She is not yet received aspirin.  The patient was cathed about 10 years ago which did not show any coronary artery disease.       Patient History     Past Medical History:   Diagnosis Date   • Arthritis    • Breast cancer (CMS/HCC) 2008    L; lumpectomy; no chemo or radiation   • Nontraumatic rupture of left posterior tibial tendon 2017   • Shoulder injury     L; shoulder dislocation 3x as a teenager   • Spondylolisthesis 2018    C/S spondylosis C5-6, C6-7       History reviewed. No pertinent surgical history.    History reviewed. No pertinent family history.    Social History     Tobacco Use   • Smoking status: Former Smoker   • Smokeless tobacco: Never Used   Substance Use Topics   • Alcohol use: Yes     Alcohol/week: 1.0 standard drinks     Types: 1  Glasses of wine per week   • Drug use: No       Systems Reviewed from Nursing Triage:          Review of Systems     Review of Systems   Constitutional: Negative for chills and fever.   HENT: Negative for ear pain and sore throat.    Eyes: Negative for pain and visual disturbance.   Respiratory: Positive for shortness of breath. Negative for cough.    Cardiovascular: Positive for chest pain. Negative for palpitations.   Gastrointestinal: Negative for abdominal pain and vomiting.   Genitourinary: Negative for dysuria and hematuria.   Musculoskeletal: Negative for arthralgias and back pain.   Skin: Positive for color change. Negative for rash.   Neurological: Negative for seizures and syncope.   All other systems reviewed and are negative.       Physical Exam     ED Vitals    Date/Time Temp Pulse Resp BP SpO2 [facility]   12/27/20 1516 36.9 °C (98.4 °F) -- 18 173/79 98 % LB                                                           Physical Exam  Vitals signs and nursing note reviewed.   Constitutional:       General: She is not in acute distress.     Appearance: She is well-developed.   HENT:      Head: Normocephalic and atraumatic.   Eyes:      Conjunctiva/sclera: Conjunctivae normal.   Neck:      Musculoskeletal: Neck supple.   Cardiovascular:      Rate and Rhythm: Normal rate and regular rhythm.      Heart sounds: No murmur.   Pulmonary:      Effort: Pulmonary effort is normal. No respiratory distress.      Breath sounds: Normal breath sounds.   Abdominal:      Palpations: Abdomen is soft.      Tenderness: There is no abdominal tenderness.   Skin:     General: Skin is warm and dry.   Neurological:      Mental Status: She is alert.              Procedures    Results     None          Imaging Results    None         ECG 12 lead    (Results Pending)               ED Course & MDM     MDM  The patient is a 70-year-old female with past medical history of sarcoma status post removal, who presents to the ED for chest pain and  shortness of breath.  Low suspicion for cardiopulmonary process currently causing the patient's symptoms.  Patient appears well, speaking in full sentences, vitals unremarkable.  The patient's EKG is not entirely normal, with trace depressions in V4/5, however troponin is normal.  CT of the chest earlier in the week showed bronchiolitis, which also could be treated contributing, but no evidence of blood clots.  Patient was discharged w/ instructions to follow-up with a cardiologist in regards to her abnormal EKG.  Return precautions provided.       ED Course as of Dec 27 1631   Sun Dec 27, 2020   1532 Normal sinus rhythm, normal axis, normal R wave progression, normal QTc, mild ST depressions in V4/5     ECG 12 lead [KH]   1542 WBC: 5.94 [KH]   1542 Hemoglobin: 15.5 [KH]   1542 Platelets: 256 [KH]   1558 BMP unremarkable    [KH]   1604 Troponin I: <0.02 [KH]      ED Course User Index  [KH] Gamal Morales MD         Clinical Impressions as of Dec 27 1631   Chest pain, unspecified type   Shortness of breath        Gamal Morales MD  Resident  12/27/20 1633

## 2020-12-27 NOTE — DISCHARGE INSTRUCTIONS
You were seen in the emergency department for chest pain and shortness of breath.  Your workup including history, exam, labs, imaging.  Your EKG is not completely normal, but your troponin (heart enzymes) are normal.  You should schedule a follow up appointment with Dr Sultana in the cardiology clinic.  They may want to do further testing such as a stress test and / or an echocardiogram.  Reasons to return to the emergency department include worsening pain, difficulty breathing, light headedness, fainting, or any other worrisome symptoms.

## 2020-12-28 LAB
ATRIAL RATE: 82
P AXIS: 69
PR INTERVAL: 148
QRS DURATION: 86
QT INTERVAL: 386
QTC CALCULATION(BAZETT): 450
R AXIS: 44
T WAVE AXIS: 69
VENTRICULAR RATE: 82

## 2021-03-04 ENCOUNTER — APPOINTMENT (EMERGENCY)
Dept: RADIOLOGY | Facility: HOSPITAL | Age: 71
End: 2021-03-04
Attending: EMERGENCY MEDICINE
Payer: COMMERCIAL

## 2021-03-04 ENCOUNTER — HOSPITAL ENCOUNTER (EMERGENCY)
Facility: HOSPITAL | Age: 71
Discharge: HOME | End: 2021-03-04
Attending: EMERGENCY MEDICINE
Payer: COMMERCIAL

## 2021-03-04 VITALS
OXYGEN SATURATION: 96 % | RESPIRATION RATE: 18 BRPM | DIASTOLIC BLOOD PRESSURE: 70 MMHG | SYSTOLIC BLOOD PRESSURE: 143 MMHG | BODY MASS INDEX: 25.83 KG/M2 | WEIGHT: 155 LBS | HEART RATE: 76 BPM | TEMPERATURE: 97 F | HEIGHT: 65 IN
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
## 2021-03-04 DIAGNOSIS — S16.1XXA STRAIN OF NECK MUSCLE, INITIAL ENCOUNTER: Primary | ICD-10-CM

## 2021-03-04 PROCEDURE — 72125 CT NECK SPINE W/O DYE: CPT | Mod: MF

## 2021-03-04 PROCEDURE — 70450 CT HEAD/BRAIN W/O DYE: CPT | Mod: ME

## 2021-03-04 PROCEDURE — 99284 EMERGENCY DEPT VISIT MOD MDM: CPT | Mod: 25

## 2021-03-04 RX ORDER — ACETAMINOPHEN 325 MG/1
325 TABLET ORAL DAILY PRN
COMMUNITY

## 2021-03-04 RX ORDER — LIDOCAINE 560 MG/1
1 PATCH PERCUTANEOUS; TOPICAL; TRANSDERMAL DAILY
Qty: 30 PATCH | Refills: 0 | Status: SHIPPED | OUTPATIENT
Start: 2021-03-04

## 2021-03-04 RX ORDER — ASPIRIN 81 MG/1
81 TABLET ORAL
COMMUNITY

## 2021-03-04 ASSESSMENT — ENCOUNTER SYMPTOMS
CONSTITUTIONAL NEGATIVE: 1
GASTROINTESTINAL NEGATIVE: 1
DIZZINESS: 1
NECK PAIN: 1
EYES NEGATIVE: 1
PSYCHIATRIC NEGATIVE: 1
RESPIRATORY NEGATIVE: 1
HEADACHES: 1
CARDIOVASCULAR NEGATIVE: 1

## 2021-03-04 NOTE — ED ATTESTATION NOTE
The patient was evaluated and managed by the PA/NP.  I have discussed the case with the PA/NP and I have reviewed the patients imaging studies.  I am in agreement with the ED workup and treatment plan.  I will continue to be available for consultation as needed.     Godshall, Duane K, MD  03/04/21 1000
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
## 2021-03-08 ENCOUNTER — TRANSCRIBE ORDERS (OUTPATIENT)
Dept: SCHEDULING | Facility: REHABILITATION | Age: 71
End: 2021-03-08

## 2021-03-08 DIAGNOSIS — M54.2 CERVICALGIA: Primary | ICD-10-CM

## 2021-03-22 ENCOUNTER — HOSPITAL ENCOUNTER (OUTPATIENT)
Dept: PHYSICAL THERAPY | Facility: REHABILITATION | Age: 71
Setting detail: THERAPIES SERIES
Discharge: HOME | End: 2021-03-22
Attending: NURSE PRACTITIONER
Payer: COMMERCIAL

## 2021-03-22 DIAGNOSIS — M54.2 CERVICALGIA: ICD-10-CM

## 2021-03-22 PROCEDURE — 97162 PT EVAL MOD COMPLEX 30 MIN: CPT | Mod: GP
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
## 2021-03-23 NOTE — OP PT TREATMENT LOG
ORTHO PT FLOWSHEET  IE: 3/22/21    Exercises Current Session Time   MODALITIES- HEAT/ICE TOTAL TIME FOR SESSION Not performed   Heat    Ice/Vasocompression    MODALITIES - E-STIM TOTAL TIME FOR SESSION Not performed   E-stim/H-Wave    THER ACT TOTAL TIME FOR SESSION Not performed   Pt education Reviewed goals and POC;   NDI 40%   HEP C/s retraction, C/S rot w towel, UT stretch   THER EX TOTAL TIME FOR SESSION Not performed   CARDIOVASCULAR     UBE    STRENGTHENING    Supine: c/s retract  C/s sustained vlex  Pec stretch  B ER    Seated  C/S retract  C/S retract+ext  SNAG/rot  UT stretch  Scalene stretch  Cat/cow  Qped alt shoulder flex  Birddog    Standing:  rows    NEURO RE-ED TOTAL TIME FOR SESSION Not performed   Postural re-ed  C/S retract  scap retraction  Neutral spine    GAIT TRAINING TOTAL TIME FOR SESSION Not performed   Ambulation     MANUAL TOTAL TIME FOR SESSION Not performed   Gentle PROM    Traction, retraction    S/L scap mobilization    STM paraspinals, scalenes, UT, levator scap, rhomboid
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
## 2021-03-26 ENCOUNTER — HOSPITAL ENCOUNTER (OUTPATIENT)
Dept: PHYSICAL THERAPY | Facility: REHABILITATION | Age: 71
Setting detail: THERAPIES SERIES
Discharge: HOME | End: 2021-03-26
Attending: NURSE PRACTITIONER
Payer: COMMERCIAL

## 2021-03-26 DIAGNOSIS — M54.2 CERVICALGIA: Primary | ICD-10-CM

## 2021-03-26 PROCEDURE — 97140 MANUAL THERAPY 1/> REGIONS: CPT | Mod: GP

## 2021-03-26 PROCEDURE — 97110 THERAPEUTIC EXERCISES: CPT | Mod: GP
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
## 2021-03-26 NOTE — OP PT TREATMENT LOG
ORTHO PT FLOWSHEET  IE: 3/22/21  [email or web address] - send HEP    Exercises Current Session Time   MODALITIES- HEAT/ICE TOTAL TIME FOR SESSION Not performed   Heat    Ice/Vasocompression    MODALITIES - E-STIM TOTAL TIME FOR SESSION Not performed   E-stim/H-Wave    THER ACT TOTAL TIME FOR SESSION Not performed   Pt education Reviewed goals and POC;   NDI 40%   HEP C/s retraction, C/S rot w towel, UT stretch   THER EX TOTAL TIME FOR SESSION 38-52 Minutes   CARDIOVASCULAR     UBE L1, 5 min, alt dir q min,    STRENGTHENING    Supine: c/s retract  C/s sustained flex  Open book   10 w 3 count  5 w 5 sec hold  10 B   Seated  C/S retract  C/S retract+ext  SNAG/rot  UT stretch  Scalene stretch  B ER  Cat/cow  Qped alt shoulder flex  Birddog   10 w towel  10 w towel  5B, min cues for form  3x10 sec (feels joint pain, not stretch)    10 B, OTB    Standing:  A/A shoulder ext  A/A shoulder IR  rows   10  10  OTB v PTB   NEURO RE-ED TOTAL TIME FOR SESSION Not performed   Postural re-ed    GAIT TRAINING TOTAL TIME FOR SESSION Not performed   Ambulation     MANUAL TOTAL TIME FOR SESSION 8-22 Minutes   Gentle PROM C/s, B shoulder   Traction, retraction gentle   S/L scap mobilization B   STM paraspinals, scalenes, UT, levator scap, rhomboid

## 2021-03-30 ENCOUNTER — HOSPITAL ENCOUNTER (OUTPATIENT)
Dept: PHYSICAL THERAPY | Facility: REHABILITATION | Age: 71
Setting detail: THERAPIES SERIES
Discharge: HOME | End: 2021-03-30
Attending: NURSE PRACTITIONER
Payer: COMMERCIAL

## 2021-03-30 DIAGNOSIS — M54.2 CERVICALGIA: Primary | ICD-10-CM

## 2021-03-30 PROCEDURE — 97140 MANUAL THERAPY 1/> REGIONS: CPT | Mod: GP

## 2021-03-30 PROCEDURE — 97110 THERAPEUTIC EXERCISES: CPT | Mod: GP

## 2021-03-30 NOTE — OP PT TREATMENT LOG
ORTHO PT FLOWSHEET  IE: 3/22/21  [email or web address] - send HEP    Exercises Current Session Time   MODALITIES- HEAT/ICE TOTAL TIME FOR SESSION Not performed   Heat    Ice/Vasocompression    MODALITIES - E-STIM TOTAL TIME FOR SESSION Not performed   E-stim/H-Wave    THER ACT TOTAL TIME FOR SESSION Not performed   Pt education Reviewed goals and POC;   NDI 40%   HEP C/s retraction, C/S rot w towel, UT stretch   THER EX TOTAL TIME FOR SESSION 38-52 Minutes   CARDIOVASCULAR     UBE L1, 5 min, alt dir q min; 3/3010 min, L1, alt dir q min   STRENGTHENING    Supine: c/s retract  C/s sustained flex  Open book  BRIAN w c/s ext 10 w 3 count w tactile cues to avoid axial extension  5 w 5 sec hold  10 B, not done  5   Seated  C/S retract  C/S retract+ext  SNAG/rot  UT stretch  Scalene stretch  B ER  Cat/cow  Qped alt shoulder flex  Birddog   10 w towel  2x5 w towel  5B, min cues for form, not done  3x10 sec w tactile cues    10 B, OTB    Standing:  A/A shoulder ext  A/A shoulder IR  rows   10  10  OTB v PTB   NEURO RE-ED TOTAL TIME FOR SESSION Not performed   Postural re-ed    GAIT TRAINING TOTAL TIME FOR SESSION Not performed   Ambulation     MANUAL TOTAL TIME FOR SESSION 8-22 Minutes   Gentle PROM C/s, B shoulder   Traction, retraction gentle   S/L scap mobilization  PA mobilization B (limited mobility)  T/S, G2   STM paraspinals, scalenes, UT, levator scap, rhomboid
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
## 2021-04-01 ENCOUNTER — HOSPITAL ENCOUNTER (OUTPATIENT)
Dept: PHYSICAL THERAPY | Facility: REHABILITATION | Age: 71
Setting detail: THERAPIES SERIES
Discharge: HOME | End: 2021-04-01
Attending: NURSE PRACTITIONER
Payer: COMMERCIAL

## 2021-04-01 DIAGNOSIS — M54.2 CERVICALGIA: Primary | ICD-10-CM

## 2021-04-01 PROCEDURE — 97140 MANUAL THERAPY 1/> REGIONS: CPT | Mod: GP

## 2021-04-01 PROCEDURE — 97110 THERAPEUTIC EXERCISES: CPT | Mod: GP

## 2021-04-01 NOTE — OP PT TREATMENT LOG
ORTHO PT FLOWSHEET  IE: 3/22/21  [email or web address] - send HEP    Exercises Current Session Time   MODALITIES- HEAT/ICE TOTAL TIME FOR SESSION Not performed   Heat    Ice/Vasocompression    MODALITIES - E-STIM TOTAL TIME FOR SESSION Not performed   E-stim/H-Wave    THER ACT TOTAL TIME FOR SESSION Not performed   Pt education Reviewed goals and POC;   NDI 40%   HEP C/s retraction, C/S rot w towel, UT stretch   THER EX TOTAL TIME FOR SESSION 38-52 Minutes   CARDIOVASCULAR     UBE L1, 5 min, alt dir q min; 3/3010 min, L1, alt dir q min;4/1 10 min alt dir q 90 sec    STRENGTHENING    Supine: c/s retract  C/s sustained flex  Open book  BRIAN w c/s ext 10 w 3 count w tactile cues to avoid axial extension  5 w 5 sec hold;4/1 3x10 sec  10 B, not done  2x5   Seated  C/S retract  C/S retract+ext  SNAG/rot  UT stretch  Scalene stretch  B ER  Cat/cow  Qped alt shoulder flex  Birddog   10 w tactile cues  2x5 w towel  5B, min cues for form, not done  3x10 sec w tactile cues    10 B, OTB, not done  10/new  5B/new  5B/new   Standing:  A/A shoulder ext  A/A shoulder IR  rows   10  10  OTB v PTB   NEURO RE-ED TOTAL TIME FOR SESSION Not performed   Postural re-ed    GAIT TRAINING TOTAL TIME FOR SESSION Not performed   Ambulation     MANUAL TOTAL TIME FOR SESSION 8-22 Minutes   Gentle PROM C/s, B shoulder   Traction, retraction gentle   S/L scap mobilization  PA mobilization B (limited mobility)  T/S, G2   STM paraspinals, scalenes, UT, levator scap, rhomboid 10 min, prominent L levator scap
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
## 2021-04-06 ENCOUNTER — HOSPITAL ENCOUNTER (OUTPATIENT)
Dept: PHYSICAL THERAPY | Facility: REHABILITATION | Age: 71
Setting detail: THERAPIES SERIES
Discharge: HOME | End: 2021-04-06
Attending: NURSE PRACTITIONER
Payer: COMMERCIAL

## 2021-04-06 DIAGNOSIS — M54.2 CERVICALGIA: Primary | ICD-10-CM

## 2021-04-06 PROCEDURE — 97140 MANUAL THERAPY 1/> REGIONS: CPT | Mod: GP

## 2021-04-06 PROCEDURE — 97530 THERAPEUTIC ACTIVITIES: CPT | Mod: GP

## 2021-04-06 PROCEDURE — 97110 THERAPEUTIC EXERCISES: CPT | Mod: GP
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
## 2021-04-06 NOTE — OP PT TREATMENT LOG
ORTHO PT FLOWSHEET  IE: 3/22/21  [email or web address] - send HEP    Exercises Current Session Time   MODALITIES- HEAT/ICE TOTAL TIME FOR SESSION Not performed   Heat    Ice/Vasocompression    MODALITIES - E-STIM TOTAL TIME FOR SESSION Not performed   E-stim/H-Wave    THER ACT TOTAL TIME FOR SESSION 8-22 Minutes   Pt education Reviewed goals and POC;4/6 Extensive discussion about pacing activities, monitoring neck pain, monitoring HA intensity, stopping activities that increase symptoms, Headache Graeme ani to tracks triggers and symptoms; contacting neurologist for HA management.Pt verbalized understanding.   NDI 40%   HEP C/s retraction, C/S rot w towel, UT stretch   THER EX TOTAL TIME FOR SESSION 8-22 Minutes   CARDIOVASCULAR     UBE L1, 5 min, alt dir q min; 3/3010 min, L1, alt dir q min;4/1 10 min alt dir q 90 sec    STRENGTHENING    Supine: c/s retract  C/s sustained flex  Open book  BRIAN  BRIAN w c/s ext  REIL 10 w 3 count w tactile cues to avoid axial extension  5 w 5 sec hold;4/1 3x10 sec; 4/6 not done  10 B, not done  30 sec x 3  5  5   Seated  C/S retract  C/S retract+ext  SNAG/rot  UT stretch  Scalene stretch  B ER  Cat/cow  Qped alt shoulder flex  Birddog   5 w tactile cues  2x5 w towel, not done  5B, min cues for form, not done  3x10 sec w tactile cues, passive gentle 2/2 pt is fearful     10 B, OTB, supine (inc HA)  10/new; 4/6 not done  5B/new; 4/6 not done  5B/new; 4/6 not done   Standing:  A/A shoulder ext  A/A shoulder IR  Rows  B O/H lat stretch  Side body stretch   10  10    Palms facing midline, 3x10 sec, inc HA  2x10 sec, B, inc HA   NEURO RE-ED TOTAL TIME FOR SESSION Not performed   Postural re-ed    GAIT TRAINING TOTAL TIME FOR SESSION Not performed   Ambulation     MANUAL TOTAL TIME FOR SESSION 23-37 Minutes   Gentle PROM  SOR C/s  3x 10 sec hold, gentle   Traction, retraction Gentle, not done   S/L scap mobilization  PA mobilization  C2, SP  C3 B (limited mobility)  C1 lateral to med (R  exquisitely painful), L is less painful  Slight deviation to R;  Does not reproduce pain  Difficulty to palpate    STM paraspinals, scalenes, UT, levator scap, rhomboid   10 min, prominent L levator scap

## 2021-04-08 ENCOUNTER — HOSPITAL ENCOUNTER (OUTPATIENT)
Dept: PHYSICAL THERAPY | Facility: REHABILITATION | Age: 71
Setting detail: THERAPIES SERIES
Discharge: HOME | End: 2021-04-08
Attending: NURSE PRACTITIONER
Payer: COMMERCIAL

## 2021-04-08 DIAGNOSIS — M54.2 CERVICALGIA: Primary | ICD-10-CM

## 2021-04-08 PROCEDURE — 97140 MANUAL THERAPY 1/> REGIONS: CPT | Mod: GP

## 2021-04-08 PROCEDURE — 97110 THERAPEUTIC EXERCISES: CPT | Mod: GP

## 2021-04-08 PROCEDURE — 97530 THERAPEUTIC ACTIVITIES: CPT | Mod: GP

## 2021-04-08 NOTE — OP PT TREATMENT LOG
ORTHO PT FLOWSHEET  IE: 3/22/21  [email or web address] - send HEP    Exercises Current Session Time   MODALITIES- HEAT/ICE TOTAL TIME FOR SESSION Not performed   Heat    Ice/Vasocompression    MODALITIES - E-STIM TOTAL TIME FOR SESSION Not performed   E-stim/H-Wave    THER ACT TOTAL TIME FOR SESSION 8-22 Minutes   Pt education Reviewed goals and POC;4/6 Extensive discussion about pacing activities, monitoring neck pain, monitoring HA intensity, stopping activities that increase symptoms, Headache Graeme ani to tracks triggers and symptoms; contacting neurologist for HA management.Pt verbalized understanding.4/8 Due to increase in symptoms after gentle TE last visit and this visit, PT will be placed on hold until pt consults with neurologist re: headaches as well as PCP re: possible post concussive symptoms (headache freq, light sensitivity, difficulty with word finding, tinnitus). Pt to    NDI 40%   HEP C/s retraction, C/S rot w towel, UT stretch   THER EX TOTAL TIME FOR SESSION 8-22 Minutes   CARDIOVASCULAR     UBE L1, 5 min, alt dir q min; 3/3010 min, L1, alt dir q min;4/1 10 min alt dir q 90 sec ;4/8 not done   STRENGTHENING    Supine: c/s retract  C/s sustained flex  Open book  BRIAN  BRIAN w c/s ext  REIL 10 w 3 count w tactile cues to avoid axial extension; 4/8  5 w 5 sec hold;4/1 3x10 sec; 4/8 not done  10 B, not done  2 min  5  5, not done   Seated  C/S retract  C/S retract+ext  SNAG/rot  UT stretch  Scalene stretch  B ER  Cat/cow  Qped alt shoulder flex  Birddog   5 w tactile cues  2x5 w towel, not done  5B, min cues for form, not done  3x10 sec w tactile cues, passive gentle 2/2 pt is fearful     10 B, OTB, supine (inc HA);4/8 10 PTB (inc HA)  10/new;  not done  5B/new;  not done  5B/new; not done   Standing:  A/A shoulder ext  A/A shoulder IR  Rows  B O/H lat stretch  Side body stretch   10  10    Palms facing midline, 3x10 sec, inc HA  2x10 sec, B, inc HA   NEURO RE-ED TOTAL TIME FOR SESSION Not performed    Postural re-ed    GAIT TRAINING TOTAL TIME FOR SESSION Not performed   Ambulation     MANUAL TOTAL TIME FOR SESSION 23-37 Minutes   Gentle PROM  SOR C/s  3x 10 sec hold, gentle   Traction, retraction Gentle, not done   S/L scap mobilization  PA mobilization  C2, SP  C3 B (limited mobility)  C1 lateral to med (R exquisitely painful), L is less painful; not done  Slight deviation to R;  Does not reproduce pain, not done  Difficulty to palpate    STM paraspinals, scalenes, UT, levator scap, rhomboid   15 min, prominent L levator scap
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
## 2021-04-14 DIAGNOSIS — Z23 ENCOUNTER FOR IMMUNIZATION: ICD-10-CM

## 2021-04-15 ENCOUNTER — OFFICE VISIT (OUTPATIENT)
Dept: NEUROLOGY | Facility: CLINIC | Age: 71
End: 2021-04-15
Payer: COMMERCIAL

## 2021-04-15 ENCOUNTER — APPOINTMENT (OUTPATIENT)
Dept: LAB | Facility: CLINIC | Age: 71
End: 2021-04-15
Attending: PSYCHIATRY & NEUROLOGY
Payer: MEDICARE

## 2021-04-15 VITALS
WEIGHT: 157 LBS | SYSTOLIC BLOOD PRESSURE: 144 MMHG | HEIGHT: 65 IN | OXYGEN SATURATION: 96 % | RESPIRATION RATE: 16 BRPM | DIASTOLIC BLOOD PRESSURE: 80 MMHG | BODY MASS INDEX: 26.16 KG/M2 | HEART RATE: 74 BPM

## 2021-04-15 DIAGNOSIS — G43.709 CHRONIC MIGRAINE WITHOUT AURA WITHOUT STATUS MIGRAINOSUS, NOT INTRACTABLE: ICD-10-CM

## 2021-04-15 DIAGNOSIS — R41.3 MEMORY DISTURBANCE: ICD-10-CM

## 2021-04-15 DIAGNOSIS — G44.221 CHRONIC TENSION-TYPE HEADACHE, INTRACTABLE: ICD-10-CM

## 2021-04-15 DIAGNOSIS — E55.9 VITAMIN D DEFICIENCY: ICD-10-CM

## 2021-04-15 DIAGNOSIS — G43.709 CHRONIC MIGRAINE WITHOUT AURA WITHOUT STATUS MIGRAINOSUS, NOT INTRACTABLE: Primary | ICD-10-CM

## 2021-04-15 DIAGNOSIS — G43.109 MIGRAINE WITH AURA AND WITHOUT STATUS MIGRAINOSUS, NOT INTRACTABLE: ICD-10-CM

## 2021-04-15 DIAGNOSIS — M54.2 CERVICALGIA: ICD-10-CM

## 2021-04-15 PROCEDURE — 3008F BODY MASS INDEX DOCD: CPT | Performed by: PSYCHIATRY & NEUROLOGY

## 2021-04-15 PROCEDURE — 84443 ASSAY THYROID STIM HORMONE: CPT

## 2021-04-15 PROCEDURE — 82306 VITAMIN D 25 HYDROXY: CPT

## 2021-04-15 PROCEDURE — 99205 OFFICE O/P NEW HI 60 MIN: CPT | Performed by: PSYCHIATRY & NEUROLOGY

## 2021-04-15 PROCEDURE — 36415 COLL VENOUS BLD VENIPUNCTURE: CPT

## 2021-04-15 PROCEDURE — 82607 VITAMIN B-12: CPT

## 2021-04-15 RX ORDER — RIBOFLAVIN (VITAMIN B2) 100 MG
TABLET ORAL
Qty: 120 TABLET | Refills: 3 | Status: SHIPPED | OUTPATIENT
Start: 2021-04-15

## 2021-04-15 RX ORDER — LANOLIN ALCOHOL/MO/W.PET/CERES
CREAM (GRAM) TOPICAL
Qty: 90 TABLET | Refills: 1 | Status: SHIPPED | OUTPATIENT
Start: 2021-04-15 | End: 2021-05-17 | Stop reason: ALTCHOICE

## 2021-04-15 RX ORDER — MEMANTINE HYDROCHLORIDE 10 MG/1
10 TABLET ORAL 2 TIMES DAILY
Qty: 180 TABLET | Refills: 1 | Status: SHIPPED | OUTPATIENT
Start: 2021-04-15 | End: 2021-05-17 | Stop reason: ALTCHOICE
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
## 2021-04-16 ENCOUNTER — TELEPHONE (OUTPATIENT)
Dept: NEUROLOGY | Facility: CLINIC | Age: 71
End: 2021-04-16

## 2021-04-16 LAB
25(OH)D3 SERPL-MCNC: 36 NG/ML (ref 30–100)
TSH SERPL DL<=0.05 MIU/L-ACNC: 1.23 MIU/L (ref 0.34–5.6)
VIT B12 SERPL-MCNC: 292 PG/ML (ref 180–914)

## 2021-04-16 NOTE — TELEPHONE ENCOUNTER
----- Message from Angy Perez MD sent at 4/16/2021  4:54 PM EDT -----  Please have pt take b12 1000mcg per day. thx

## 2021-04-16 NOTE — TELEPHONE ENCOUNTER
I called Ema to let her know to start taking B-12 1000mcg. She wanted to update you and let you know that she is taking B2 100mg 2 tabs in the AM.    ALSO, she wants to know if she can start taking the Memantine at 5mg instead of 10mg because one of the side effects is headaches.

## 2021-04-19 NOTE — TELEPHONE ENCOUNTER
In regards to b12 - I would like for her to take b12 1000 mcg per day     Yes she may start with 5mg of namenda and see she she does with this and increase up slowly in two weeks to higher dose if tolerated.   Thx

## 2021-04-21 ENCOUNTER — TELEPHONE (OUTPATIENT)
Dept: NEUROLOGY | Facility: CLINIC | Age: 71
End: 2021-04-21

## 2021-04-21 ENCOUNTER — DOCUMENTATION (OUTPATIENT)
Dept: SOCIAL WORK | Facility: REHABILITATION | Age: 71
End: 2021-04-21

## 2021-04-21 NOTE — PROGRESS NOTES
CM spoke with patient regarding plan of care. Patient will be discharged from Neck PT at this time per reported increased headaches.   Patient will see Dr. Joseph Leach DO, next week for evaluation of possible concussion.   CM emailed Dr. Leach and Maryam TONG MA, to confirm. Per Maryam TONG, patient will be seen next week. She will contact patient to confirm appointment.  CM provided update to patient.Mare Manuel CM

## 2021-04-21 NOTE — TELEPHONE ENCOUNTER
Patient called states she would like to cancel her appt. And would need a letter of Discharge any questions or concerns she can be reached at [phone].

## 2021-04-22 NOTE — TELEPHONE ENCOUNTER
I just wanted to make you aware of this. I left her a message and sent a portal message asking for more information. I await her response.

## 2021-04-22 NOTE — TELEPHONE ENCOUNTER
I called the patient and left a detailed message. In addition, I sent a message to her on the patient portal.

## 2021-04-23 ENCOUNTER — DOCUMENTATION (OUTPATIENT)
Dept: PHYSICAL THERAPY | Facility: REHABILITATION | Age: 71
End: 2021-04-23

## 2021-04-23 NOTE — PROGRESS NOTES
PT DISCHARGE NOTE FOR OUTPATIENT THERAPY    Patient: Ema Black   MRN: [id number]  : 1950 70 y.o.  Referring Physician: EUNICE Delgado  Date of Visit: 2021      Certification Dates: 21 through 21    Total Visit Count: 6 including initial evaluation    Chief Complaints:  Chief Complaint   Patient presents with   • no visit discharge     No visit discharge. Pt will be seeing concussion specialist.    OBJECTIVE MEASUREMENTS/DATA:    None taken    Today's Treatment:        Goals Addressed                 This Visit's Progress    • COMPLETED: PT LTG NECK         Long Term  Goals Time Frame Result Comment/Progress   Pt will increase cervical endurance>=20 seconds without pain  8-12 weeks D/C      Pt will increase cervical ROM >/= 20 degrees into cervical rotation 8-12 weeks D/C IE: R rot 56, L 59   Pt will improve NDI </= 20% impairment 8-12 weeks D/C IE: 40% impaired   Pt will reports neck pain <=1/10 to drive with more comfort 8-12 weeks D/C    Pt will be independent with established HEP 8-12 weeks D/C    Pt will report 75% decrease in HA frequency and intensity 8-12  weeks D/C    Pt will reports doing yoga 1-2/wk <=2/10 increase in symptoms 8-12  D/C    Pt will be independent with  postural correction for pain management 8 weeks D/C          • COMPLETED: PT STG NECK        Short Term Goals Time Frame Result Comment/Progress   Pt will increase cervical endurance>=5 seconds, <= 2/10 increase in symptoms 4 weeks D/C IE: sustained flex 8 sec; sustained retraction 17 sec     Pt will increase cervical ROM >/= 10 degrees into cervical flexion, extension, lateral flexion, rotation 4 weeks D/C IE: R rot 56, L 59;flex 26, ext 19, R SB 12, L SB 5   Pt will improve NDI </= 30% impairment 4 weeks D/C IE: 40% impaired   Pt will reports neck pain <=2/10 most often to increase activity tolerance 4 weeks D/C    Pt will be supervision with established HEP 4 weeks D/C    Pt will report 50% decrease in  HA frequency and intensity 4 weeks D/C    Pt will be educated on  postural correction for pain management 2 weeks D/c                  Discharge information for [facility]:    Reason for D/C: Patient has not returned  Hospitalization during treatment: No  Increased independence: [place] in HEP  Increased Production - Other: No formal assessement. Unable to comment on change in production assessment  Interrupted for medical reason: Yes (Pt referred to concussion specialist.)  Skin integrity: Unable to assess

## 2021-04-29 ENCOUNTER — TRANSCRIBE ORDERS (OUTPATIENT)
Dept: SCHEDULING | Facility: REHABILITATION | Age: 71
End: 2021-04-29

## 2021-04-29 DIAGNOSIS — R42 DIZZINESS AND GIDDINESS: ICD-10-CM

## 2021-04-29 DIAGNOSIS — M54.2 CERVICALGIA: ICD-10-CM

## 2021-04-29 DIAGNOSIS — S06.0X0S CONCUSSION WITHOUT LOSS OF CONSCIOUSNESS, SEQUELA (CMS/HCC): Primary | ICD-10-CM

## 2021-04-30 ENCOUNTER — HOSPITAL ENCOUNTER (OUTPATIENT)
Dept: OCCUPATIONAL THERAPY | Facility: REHABILITATION | Age: 71
Setting detail: THERAPIES SERIES
Discharge: HOME | End: 2021-04-30
Attending: PHYSICAL MEDICINE & REHABILITATION
Payer: COMMERCIAL

## 2021-04-30 DIAGNOSIS — S06.0X0S CONCUSSION WITHOUT LOSS OF CONSCIOUSNESS, SEQUELA (CMS/HCC): Primary | ICD-10-CM

## 2021-04-30 DIAGNOSIS — M54.2 CERVICALGIA: ICD-10-CM

## 2021-04-30 DIAGNOSIS — R42 DIZZINESS AND GIDDINESS: ICD-10-CM

## 2021-04-30 PROCEDURE — 97167 OT EVAL HIGH COMPLEX 60 MIN: CPT | Mod: GO

## 2021-04-30 NOTE — OP OT TREATMENT LOG
VISION/CONCUSSION OT FLOW SHEET    OT Vision/mTBI  EXERCISES CURRENT SESSION TIME   NEURO RE-ED TOTAL TIME FOR SESSION Not performed   Dynavision    VTS3/VTS4    CPT    BITs    NVR    Saccadic Fixation    Ocular Motor Control    Visual Tracing    3D Tracking    Visual Perception    Convergence/Divergence    Accommodation    Visual Stimulation    THER ACT TOTAL TIME FOR SESSION Not performed   Pain, Vitals, Meds, Etc.    HEP    Visual Endurance     Work/School Simulation     SELF CARE TOTAL TIME FOR SESSION Not performed   PCS Symptom Management/Education    ADL/IADLs
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
## 2021-05-03 ENCOUNTER — HOSPITAL ENCOUNTER (OUTPATIENT)
Dept: PHYSICAL THERAPY | Facility: REHABILITATION | Age: 71
Setting detail: THERAPIES SERIES
Discharge: HOME | End: 2021-05-03
Attending: PHYSICAL MEDICINE & REHABILITATION
Payer: COMMERCIAL

## 2021-05-03 DIAGNOSIS — R42 DIZZINESS AND GIDDINESS: ICD-10-CM

## 2021-05-03 DIAGNOSIS — S06.0X0S CONCUSSION WITHOUT LOSS OF CONSCIOUSNESS, SEQUELA (CMS/HCC): ICD-10-CM

## 2021-05-03 DIAGNOSIS — M54.2 CERVICALGIA: ICD-10-CM

## 2021-05-03 PROCEDURE — 97163 PT EVAL HIGH COMPLEX 45 MIN: CPT

## 2021-05-03 PROCEDURE — 97530 THERAPEUTIC ACTIVITIES: CPT | Mod: GP

## 2021-05-04 ENCOUNTER — HOSPITAL ENCOUNTER (OUTPATIENT)
Dept: OCCUPATIONAL THERAPY | Facility: REHABILITATION | Age: 71
Setting detail: THERAPIES SERIES
Discharge: HOME | End: 2021-05-04
Attending: PHYSICAL MEDICINE & REHABILITATION
Payer: COMMERCIAL

## 2021-05-04 DIAGNOSIS — R42 DIZZINESS AND GIDDINESS: ICD-10-CM

## 2021-05-04 DIAGNOSIS — S06.0X0S CONCUSSION WITHOUT LOSS OF CONSCIOUSNESS, SEQUELA (CMS/HCC): Primary | ICD-10-CM

## 2021-05-04 DIAGNOSIS — M54.2 CERVICALGIA: ICD-10-CM

## 2021-05-04 PROCEDURE — 97112 NEUROMUSCULAR REEDUCATION: CPT | Mod: GO

## 2021-05-04 PROCEDURE — 97535 SELF CARE MNGMENT TRAINING: CPT | Mod: GO,59

## 2021-05-04 PROCEDURE — 97530 THERAPEUTIC ACTIVITIES: CPT | Mod: GO

## 2021-05-04 NOTE — OP PT TREATMENT LOG
TREATMENT LOG   Precautions: neck, vision   Eval Date:5/3/21 Progress Note:   POC expires: 8/1/21 Units Allowed/Billed:   PT Vestibular Exercises Current Session Time   CANALITH  REPOSITIONING TREATMENT Y/N Notes Not performed         NEURO RE-ED Y/N Notes Not performed   BPPV ASSESSMENT     VOR CANCELLATION                      Standing H/VVOR-C     Ambulation w/ H/VVOR-C     VOR / GAZE STABILITY     *DVA/GST     Standing H/VVOR     Ambulation w/H/VVOR     H/VVOR on compliant surfaces     Functional VOR     HABITUATION     *MSQ     Ball circles     Repetitive functional movements     BALANCE- STATIC     *SOT/ HSSOT     *Lozoya     On floor     Airex foam     Rockerboard     BALANCE- DYNAMIC     *DGI/FGA     Ambulation-head turns/nods     Ambulation - 180 & 360 degree turns     Retro ambulation EO     Ambulation with EC     Obstacles     Tandem     Cervical Kinesthesia     THER-EX  Y/N SETS  REPS  LOAD Not performed   STRETCHING        Chin tucks (supine, seated)        Upper Trapezius Stretch        Levator Scapula stretch        Scalene/SCM stretch        Doorway Pectoralis stretch         Open Book/ thread the needle thoracic rotation stretches                ROM        SNAGS for rotation        SNAGS for extension        Supine snow angels        Seated thoracic extension                STRENGTHENING        Cervical Stabilization        Cervical isometrics (c/s rot, flex, ext, SB, capital flex)        Resisted chin tucks w/ TB        Cervical retractions prone on elbows        Deep cervical flexor strengthening                Scapular Stabilization        Seated scap retraction/depression        No money (B/L UE ER w/ TB)        TB rows/overhead rows        TB shoulder extension         TB shoulder horiz ABD        Standing chin tuck with shoulder flexion/abduction        W to Y’s (supine or standing)        Prone T, Y, I         Modified supermans                CARDIOVASCULAR     UBE      Recumbent bike     "  Treadmill/BCTT      MANUAL Y/N  Not performed   STM/MFR/TPR     IASTM     Joint Mobilizations          MODALITIES Y/N  Not performed   Heat/Ice    TENS        THER ACT Y/N  15 minutes  }   Review pain, meds, falls, vitals, symptoms     *Subjective Measures        Patient Education/HEP Educated patient on the role of physical and occupational therapy on managing concussions.  Discussed with her the results of MSQ and the likliness of starting a new job and stress of managing her mother's estate will likely increase her symptoms. Discussed with her to hold off on yoga currently due to her MSQ score, as well as her recent fall while doing yoga.  Encouraged her to try meditation and easy walks to avoid symptom elevation.  Issued \"More Than Just a Bump on The Head\" booklet and concussion handout folder.        "

## 2021-05-04 NOTE — OP OT TREATMENT LOG
VISION/CONCUSSION OT FLOW SHEET    OT Vision/mTBI  EXERCISES CURRENT SESSION TIME   NEURO RE-ED TOTAL TIME FOR SESSION 8-22 Minutes   Dynavision  incr nausea x1 trial - see vision tab for details   VTS3/VTS4    CPT    BITs    NVR    Saccadic Fixation  assessed performed short saccades and KDT   Ocular Motor Control    Visual Tracing    3D Tracking    Visual Perception    Convergence/Divergence    Accommodation    Visual Stimulation    THER ACT TOTAL TIME FOR SESSION 23-37 Minutes   Pain, Vitals, Meds, Etc.  Monitored, recorded, educated pt on triggers/pain management strategies   HEP  Issued Level 1 saccades, trails. Demo'd how to perform and encouraged daily participation. Demo'd page block strategies as pt could not tolerate looking at Level 1 trails without immediate feelings of nausea - verbs good understanding.   Visual Endurance     Work/School Simulation     SELF CARE TOTAL TIME FOR SESSION 8-22 Minutes   PCS Symptom Management/Education  Extensive education provided re: PCS symptom management strategies, including pacing, spacing out tasks req'ing incr'd levels of visual processing/attention.  Pt verbalizing difficulty sleeping, not willing to rest during the day as she is afraid it will further interfere with nighttime routine. Educated on need for intermittent rest and activity for managing PCS symptoms. Pt surprised that she did not feel well after today's session - educated on likely triggers based on today's objective findings, educated on gradual habituation - will likely require cont'd reinforcement   ADL/IADLs
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
## 2021-05-05 ENCOUNTER — TELEPHONE (OUTPATIENT)
Dept: ADMISSIONS | Facility: REHABILITATION | Age: 71
End: 2021-05-05
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
## 2021-05-07 ENCOUNTER — HOSPITAL ENCOUNTER (OUTPATIENT)
Dept: PSYCHOLOGY | Facility: CLINIC | Age: 71
Discharge: HOME | End: 2021-05-07
Attending: PHYSICAL MEDICINE & REHABILITATION
Payer: COMMERCIAL

## 2021-05-07 DIAGNOSIS — G31.84 MCI (MILD COGNITIVE IMPAIRMENT): ICD-10-CM

## 2021-05-07 PROCEDURE — 96137 PSYCL/NRPSYC TST PHY/QHP EA: CPT | Performed by: PSYCHOLOGIST

## 2021-05-07 PROCEDURE — 96133 NRPSYC TST EVAL PHYS/QHP EA: CPT | Performed by: PSYCHOLOGIST

## 2021-05-07 PROCEDURE — 96136 PSYCL/NRPSYC TST PHY/QHP 1ST: CPT | Performed by: PSYCHOLOGIST

## 2021-05-07 PROCEDURE — 96132 NRPSYC TST EVAL PHYS/QHP 1ST: CPT | Performed by: PSYCHOLOGIST

## 2021-05-07 ASSESSMENT — COGNITIVE AND FUNCTIONAL STATUS - GENERAL
THOUGHT_CONTENT: APPROPRIATE
EYE_CONTACT: WNL
APPEARANCE: WELL GROOMED
RECENT MEMORY: MILD LOSS
DELUSIONS: NONE OR AGE APPROPRIATE
IMPULSE CONTROL: INTACT
AFFECT: FULL RANGE
SLEEP_WAKE_CYCLE: DECREASED
MOOD: ANXIOUS;IRRITABLE
PSYCHOMOTOR FUNCTIONING: WNL
LIBIDO: NON-CONTRIBUTORY
APPETITE: NO CHANGE
ORIENTATION: FULLY ORIENTED
INSIGHT: INTACT;AWARE OF PHYSICAL LIMITATIONS;AWARE OF IMPACT ON FUNCTIONING
EST. PREMORBID INTELLIGENCE: AVERAGE
SPEECH: REGULAR
CONCENTRATION: WNL
REMOTE MEMORY: WNL
AROUSAL LEVEL: ALERT
THOUGHT_PROCESS: WNL
ATTENTION: WNL

## 2021-05-12 ENCOUNTER — HOSPITAL ENCOUNTER (OUTPATIENT)
Dept: OCCUPATIONAL THERAPY | Facility: REHABILITATION | Age: 71
Setting detail: THERAPIES SERIES
Discharge: HOME | End: 2021-05-12
Attending: PHYSICAL MEDICINE & REHABILITATION
Payer: COMMERCIAL

## 2021-05-12 DIAGNOSIS — S06.0X0S CONCUSSION WITHOUT LOSS OF CONSCIOUSNESS, SEQUELA (CMS/HCC): Primary | ICD-10-CM

## 2021-05-12 PROCEDURE — 97112 NEUROMUSCULAR REEDUCATION: CPT | Mod: GO

## 2021-05-12 PROCEDURE — 97535 SELF CARE MNGMENT TRAINING: CPT | Mod: GO

## 2021-05-12 NOTE — PROGRESS NOTES
OT DAILY NOTE FOR OUTPATIENT THERAPY    Patient: Ema Black   MRN: [id number]  : 1950 70 y.o.  Referring Physician: Joseph Leach DO  Date of Visit: 2021      Certification Dates: 21 through 21    Diagnosis:   1. Concussion without loss of consciousness, sequela (CMS/[facility])        Chief Complaints:        Precautions:       TODAY'S VISIT      Daily Treatment Assessment and Plan - 21 1901        Daily Treatment Assessment and Plan    Progress toward goals  Progressing     Daily Outcome Summary  Pt reported friustrations of not knowing what to do and how to manage syptoms.  She perseverated on the issue of not getting into therapy quickly enough.  Once education was provided re: what a concussion really is and the plans for moving forward with increqasing symptoms pt appeared to have a better understanidng.  Increased symptoms with upward gaze during Word A Round task     Plan and Recommendations  Continue with OT POC           OBJECTIVE DATA TAKEN TODAY    None Taken    Today's Treatment:    OT Vision/mTBI  EXERCISES CURRENT SESSION TIME   NEURO RE-ED TOTAL TIME FOR SESSION 15   Dynavision    VTS3/VTS4    CPT    BITs    NVR    Saccadic Fixation Spot it with cards at a comfortable distance in horizontal saccade.  Pt tolerated the session activity well with no report of symptoms    Word A Round with card placed superiorly to encourage upward gaze, increased nausea with task.  Pt was encouraged to push self to complete more to increase the symptoms and then rest   Ocular Motor Control Word a round for ocular mobs, increased nausea   Visual Tracing    3D Tracking    Visual Perception    Convergence/Divergence    Accommodation    Visual Stimulation    THER ACT TOTAL TIME FOR SESSION    Pain, Vitals, Meds, Etc.    HEP    Visual Endurance     Work/School Simulation     THER EX TOTAL TIME FOR SESSION    Activity Tolerance     SELF CARE TOTAL TIME FOR SESSION 45   PCS Symptom  Management/Education Pt reported no headache at the beginning or the end of the session.  Pt upset about MD not wanting her to go to SLP services.  However, the deficits that she reported to this therapist today are more OT related ie orientation of clothing when dressing, increased symptoms with activity during the day.  Pt was educated on the importance of ramping up her symptoms then resting and getting right back to the task after short break.  Pt was educated on the shut down of her brain functional and ability to differentiate and extrapolate sensory information d/t the concussion.  Pt appeared to be understanding of the explanation and education on the importance of doing all the homework issued to her since she is not here often    ADL/IADLs

## 2021-05-12 NOTE — OP OT TREATMENT LOG
OT Vision/mTBI  EXERCISES CURRENT SESSION TIME   NEURO RE-ED TOTAL TIME FOR SESSION 15   Dynavision    VTS3/VTS4    CPT    BITs    NVR    Saccadic Fixation Spot it with cards at a comfortable distance in horizontal saccade.  Pt tolerated the session activity well with no report of symptoms    Word A Round with card placed superiorly to encourage upward gaze, increased nausea with task.  Pt was encouraged to push self to complete more to increase the symptoms and then rest   Ocular Motor Control Word a round for ocular mobs, increased nausea   Visual Tracing    3D Tracking    Visual Perception    Convergence/Divergence    Accommodation    Visual Stimulation    THER ACT TOTAL TIME FOR SESSION    Pain, Vitals, Meds, Etc.    HEP    Visual Endurance     Work/School Simulation     THER EX TOTAL TIME FOR SESSION    Activity Tolerance     SELF CARE TOTAL TIME FOR SESSION 45   PCS Symptom Management/Education Pt reported no headache at the beginning or the end of the session.  Pt upset about MD not wanting her to go to SLP services.  However, the deficits that she reported to this therapist today are more OT related ie orientation of clothing when dressing, increased symptoms with activity during the day.  Pt was educated on the importance of ramping up her symptoms then resting and getting right back to the task after short break.  Pt was educated on the shut down of her brain functional and ability to differentiate and extrapolate sensory information d/t the concussion.  Pt appeared to be understanding of the explanation and education on the importance of doing all the homework issued to her since she is not here often    ADL/IADLs
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
## 2021-05-18 ENCOUNTER — HOSPITAL ENCOUNTER (OUTPATIENT)
Dept: PHYSICAL THERAPY | Facility: REHABILITATION | Age: 71
Setting detail: THERAPIES SERIES
Discharge: HOME | End: 2021-05-18
Attending: PHYSICAL MEDICINE & REHABILITATION
Payer: COMMERCIAL

## 2021-05-18 DIAGNOSIS — M54.2 CERVICALGIA: ICD-10-CM

## 2021-05-18 DIAGNOSIS — S06.0X0S CONCUSSION WITHOUT LOSS OF CONSCIOUSNESS, SEQUELA (CMS/HCC): Primary | ICD-10-CM

## 2021-05-18 PROCEDURE — 97530 THERAPEUTIC ACTIVITIES: CPT | Mod: GP

## 2021-05-18 PROCEDURE — 97112 NEUROMUSCULAR REEDUCATION: CPT | Mod: GP

## 2021-05-18 NOTE — OP PT TREATMENT LOG
TREATMENT PLAN:                  TREATMENT LOG       Precautions: neck, vision       Eval Date:5/3/21 Progress Note:       POC expires: 8/1/21 Units Allowed/Billed:       PT Vestibular Exercises Current Session Time       CANALITH  REPOSITIONING TREATMENT Y/N Notes Not performed                     NEURO RE-ED Y/N Notes 45 minutes       BPPV ASSESSMENT           VOR CANCELLATION                           Standing H/VVOR-C           Ambulation w/ H/VVOR-C           VOR / GAZE STABILITY           *DVA/GST           Seated H/VVOR Yes VVOR x 60 seconds at SSV (35HT/min)  HVOR x 60 seconds at SSV       Standing H/VVOR Yes Attempted but unable to tolerate       Ambulation w/H/VVOR           H/VVOR on compliant surfaces           Functional VOR           HABITUATION           *MSQ  Yes 5/18/21: Completed 12.9%       Ball circles           Repetitive functional movements           BALANCE- STATIC           *SOT/ HSSOT           *Lozoya           On floor           Airex foam           Rockerboard           BALANCE- DYNAMIC           FGA  Yes 5/18/21: Completed 22/30       Ambulation-head turns/nods           Ambulation - 180 & 360 degree turns           Retro ambulation EO           Ambulation with EC           Obstacles           Tandem           Cervical Kinesthesia           THER-EX  Y/N SETS  REPS  LOAD 5 minutes        STRETCHING                 Chin tucks (supine, seated)  Yes 5 sec   1 10         Upper Trapezius Stretch  Yes 20 sec 1 3         Levator Scapula stretch                 Scalene/SCM stretch                 Doorway Pectoralis stretch                  Open Book/ thread the needle thoracic rotation stretches                                   ROM                 SNAGS for rotation                 SNAGS for extension                 Supine snow angels                 Seated thoracic extension                                   STRENGTHENING                 Cervical Stabilization                 Cervical  isometrics (c/s rot, flex, ext, SB, capital flex)                 Resisted chin tucks w/ TB                 Cervical retractions prone on elbows                 Deep cervical flexor strengthening                                   Scapular Stabilization                 Seated scap retraction/depression                 No money (B/L UE ER w/ TB)                 TB rows/overhead rows                 TB shoulder extension                  TB shoulder horiz ABD                 Standing chin tuck with shoulder flexion/abduction                 W to Y’s (supine or standing)                 Prone T, Y, I                  Modified supermans                                   CARDIOVASCULAR           UBE            Recumbent bike            Treadmill/BCTT            MANUAL Y/N   Not performed       STM/MFR/TPR           IASTM           Joint Mobilizations                       MODALITIES Y/N   Not performed       Heat/Ice         TENS                   THER ACT Y/N   15 minutes   }    Review pain, meds, falls, vitals, symptoms       *Subjective Measures           Patient Education/HEP Discussed the likelihood of vestibular therapy elevating her symptoms and the importance of scheduling PT and OT on days she is not have a lot of other things scheduled.  Educated patient on how concussion is an energy crisis and to monitor how much energy she is expending during the day with her household activities as well as her job and therapy to avoid overextending herself and increasing her symptoms.  Encouraged her to use her symptoms as a guide to modify her activity and schedule going forward to quicken recovery.
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
## 2021-05-24 ENCOUNTER — HOSPITAL ENCOUNTER (OUTPATIENT)
Dept: OCCUPATIONAL THERAPY | Facility: REHABILITATION | Age: 71
Setting detail: THERAPIES SERIES
Discharge: HOME | End: 2021-05-24
Attending: PHYSICAL MEDICINE & REHABILITATION
Payer: COMMERCIAL

## 2021-05-24 DIAGNOSIS — S06.0X0S CONCUSSION WITHOUT LOSS OF CONSCIOUSNESS, SEQUELA (CMS/HCC): Primary | ICD-10-CM

## 2021-05-24 PROCEDURE — 97535 SELF CARE MNGMENT TRAINING: CPT | Mod: GO,59

## 2021-05-24 PROCEDURE — 97530 THERAPEUTIC ACTIVITIES: CPT | Mod: GO

## 2021-05-24 NOTE — OP OT TREATMENT LOG
VISION/CONCUSSION OT FLOW SHEET    OT Vision/mTBI  EXERCISES CURRENT SESSION TIME   NEURO RE-ED TOTAL TIME FOR SESSION Not performed   Dynavision    VTS3/VTS4    CPT    BITs    NVR    Saccadic Fixation    Ocular Motor Control    Visual Tracing    3D Tracking    Visual Perception    Convergence/Divergence    Accommodation    Visual Stimulation    THER ACT TOTAL TIME FOR SESSION 23-37 Minutes   Pain, Vitals, Meds, Etc.    HEP    Visual Endurance  Pt reported that she has been trying to read and focus more on increasing her symptoms before she requires a rest break.  Pt is able to read for about 45 minutes with browsing headlines and taking the important information via heading and conclusion [facility]   Work/School Simulation  Pt working about 15 hours a week and will be increasing her hours this week .  Pt reported that she does not have many headaches s/p work d/t not having to perform too =tedious work.  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  Pt also educated on trying to read for longer periods of time in order to increase the symptoms and see how long it will take to recover from the symptoms.  Pt reported that she feels as though she is improving    ADL/IADLs
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
## 2021-05-25 ENCOUNTER — HOSPITAL ENCOUNTER (OUTPATIENT)
Dept: PHYSICAL THERAPY | Facility: REHABILITATION | Age: 71
Setting detail: THERAPIES SERIES
Discharge: HOME | End: 2021-05-25
Attending: PHYSICAL MEDICINE & REHABILITATION
Payer: COMMERCIAL

## 2021-05-25 DIAGNOSIS — S06.0X0S CONCUSSION WITHOUT LOSS OF CONSCIOUSNESS, SEQUELA (CMS/HCC): Primary | ICD-10-CM

## 2021-05-25 DIAGNOSIS — M54.2 CERVICALGIA: ICD-10-CM

## 2021-05-25 PROCEDURE — 97112 NEUROMUSCULAR REEDUCATION: CPT | Mod: GP

## 2021-05-25 PROCEDURE — 97530 THERAPEUTIC ACTIVITIES: CPT | Mod: GP
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
## 2021-05-25 NOTE — OP PT TREATMENT LOG
TREATMENT PLAN:                  TREATMENT LOG       Precautions: neck, vision       Eval Date:5/3/21 Progress Note:       POC expires: 8/1/21 Units Allowed/Billed:       PT Vestibular Exercises Current Session Time       CANALITH  REPOSITIONING TREATMENT Y/N Notes Not performed                     NEURO RE-ED Y/N Notes 45 minutes       BPPV ASSESSMENT           VOR CANCELLATION                           Standing H/VVOR-C           Ambulation w/ H/VVOR-C           VOR / GAZE STABILITY           *DVA/GST           Seated H/VVOR Yes to all VVOR x 60 seconds at SSV (36HT/min)2/10 dizzy and nausea  HVOR x 60 seconds at SSV (47 HN/min) 2/10 dizzy and nausea    Repeated with metronome:  -HVOR x 60 seconds with met @ 70 bpm    -VVOR x 60 seconds w/ met @ 80 bpm       Standing H/VVOR  Attempted but unable to tolerate       Ambulation w/H/VVOR           H/VVOR on compliant surfaces           Functional VOR           HABITUATION           *MSQ   5/18/21: Completed 12.9%       Ball circles           Repetitive functional movements           BALANCE- STATIC           *SOT Yes 5/25/21 Completed composite score 54 (16% below) 2/10 HA and DIzziness       *Lozoya           On floor           Airex foam           Rockerboard           BALANCE- DYNAMIC           FGA  5/18/21: Completed 22/30       Ambulation-head turns/nods           Ambulation - 180 & 360 degree turns           Retro ambulation EO           Ambulation with EC           Obstacles           Tandem           Cervical Kinesthesia           THER-EX  Y/N SETS  REPS  LOAD Not performed       STRETCHING                 Chin tucks (supine, seated)  5 sec   1 10         Upper Trapezius Stretch  20 sec 1 3         Levator Scapula stretch                 Scalene/SCM stretch                 Doorway Pectoralis stretch                  Open Book/ thread the needle thoracic rotation stretches                                   ROM                 SNAGS for rotation                  SNAGS for extension                 Supine snow angels                 Seated thoracic extension                                   STRENGTHENING                 Cervical Stabilization                 Cervical isometrics (c/s rot, flex, ext, SB, capital flex)                 Resisted chin tucks w/ TB                 Cervical retractions prone on elbows                 Deep cervical flexor strengthening                                   Scapular Stabilization                 Seated scap retraction/depression                 No money (B/L UE ER w/ TB)                 TB rows/overhead rows                 TB shoulder extension                  TB shoulder horiz ABD                 Standing chin tuck with shoulder flexion/abduction                 W to Y’s (supine or standing)                 Prone T, Y, I                  Modified supermans                                   CARDIOVASCULAR           UBE            Recumbent bike            Treadmill/BCTT            MANUAL Y/N   Not performed       STM/MFR/TPR           IASTM           Joint Mobilizations                       MODALITIES Y/N   Not performed       Heat/Ice         TENS                   THER ACT Y/N   10 minutes   }    Review pain, meds, falls, vitals, symptoms       *Subjective Measures           Patient Education/HEP Added metronome to her HEP and encouraged her to continue with VOR exercises three times per day to help with integrating her vestibular system to her daily movements.  Discussed the results of her SOT and her no score vestibular score.

## 2021-06-01 ENCOUNTER — HOSPITAL ENCOUNTER (OUTPATIENT)
Dept: PHYSICAL THERAPY | Facility: REHABILITATION | Age: 71
Setting detail: THERAPIES SERIES
Discharge: HOME | End: 2021-06-01
Attending: PHYSICAL MEDICINE & REHABILITATION
Payer: COMMERCIAL

## 2021-06-01 DIAGNOSIS — S06.0X0S CONCUSSION WITHOUT LOSS OF CONSCIOUSNESS, SEQUELA (CMS/HCC): Primary | ICD-10-CM

## 2021-06-01 DIAGNOSIS — M54.2 CERVICALGIA: ICD-10-CM

## 2021-06-01 PROCEDURE — 97110 THERAPEUTIC EXERCISES: CPT | Mod: GP

## 2021-06-01 PROCEDURE — 97112 NEUROMUSCULAR REEDUCATION: CPT | Mod: GP

## 2021-06-01 NOTE — OP PT TREATMENT LOG
TREATMENT LOG       Precautions: neck, vision       Eval Date:5/3/21 Progress Note:       POC expires: 8/1/21 Units Allowed/Billed:       PT Vestibular Exercises Current Session Time       CANALITH  REPOSITIONING TREATMENT Y/N Notes Not performed                     NEURO RE-ED Y/N Notes 45 minutes       BPPV ASSESSMENT           VOR CANCELLATION                           Standing H/VVOR-C           Ambulation w/ H/VVOR-C           VOR / GAZE STABILITY           *DVA/GST           Seated H/VVOR Yes VOR x 60 seconds w/ met:  - HVOR @ 78bpm, no dizziness but 1/10 nausea    -VVOR @ 88, no increase in dizziness, but 1/10 nausea increase (held target in hand with reacher ~ 1 foot out)       Standing H/VVOR Yes  Standing FA with hands on back of chair 3 feet back from blue board:    -HVOR w/ met @ 78bpm x 60 seconds (2/10 dizziness and nausea which took 2 minutes to recover)  -VVOR w/ met @ 88bpm x 60 seconds (1/10 dizziness and nausea)       Ambulation w/H/VVOR           H/VVOR on compliant surfaces           Functional VOR           HABITUATION           *MSQ   5/18/21: Completed 12.9%       Ball circles           Repetitive functional movements           BALANCE- STATIC           *SOT Yes 5/25/21 Completed composite score 54 (16% below) 2/10 HA and DIzziness       *Lozoya           On floor  Yes Tandem EO 60 sec each no sway  3/4 tandem arms down EC with mild sway and increased nausea to 2/10       Airex foam  yes - FT EO 60 seconds no sway or increase in dizziness  - FT EC 20 sec x 4 with mild sway and 2/10 HA and dizziness       Rockerboard           BALANCE- DYNAMIC           FGA  5/18/21: Completed 22/30       Ambulation-head turns/nods  Yes 2 laps with head turns q 3 steps with cues to visually focus on a target  - HT 2/10 dizziness  - HN 3-4/10 dizziness took 2 minutes to recover       Ambulation - 180 & 360 degree turns           Retro ambulation EO           Ambulation with EC            Obstacles           Tandem           Cervical Kinesthesia           THER-EX  Y/N SETS  REPS  LOAD 8 minutes       STRETCHING                 Chin tucks (supine and seated) Yes 5 sec   1 10  each       Upper Trapezius Stretch  20 sec 1 3         Levator Scapula stretch                 Scalene/SCM stretch                 Doorway Pectoralis stretch                  Open Book/ thread the needle thoracic rotation stretches                                   ROM                 SNAGS for rotation                 SNAGS for extension                 Supine snow angels                 Seated thoracic extension                                   STRENGTHENING                 Cervical Stabilization                 Cervical isometrics (c/s rot, flex, ext, SB, capital flex)                 Resisted chin tucks w/ TB                 Cervical retractions prone on elbows                 Deep cervical flexor strengthening                                   Scapular Stabilization                 Seated scap retraction/depression                 No money (B/L UE ER w/ TB)                 TB rows/overhead rows                 TB shoulder extension                  TB shoulder horiz ABD                 Standing chin tuck with shoulder flexion/abduction                 W to Y’s (supine or standing)                 Prone T, Y, I                  Modified supermans                                   CARDIOVASCULAR           UBE Yes  Level 1, retro, seat 8, 5 minutes        Recumbent bike            Treadmill/BCTT            MANUAL Y/N   Not performed       STM/MFR/TPR           IASTM           Joint Mobilizations                       MODALITIES Y/N   Not performed       Heat/Ice         TENS                   THER ACT Y/N   <5 minutes minutes   }    Review pain, meds, falls, vitals, symptoms       *Subjective Measures           Patient Education/HEP Encouraged use of metronome with VOR exercises at home.  Added static balance exercises to  HEP, along with chin tucks (seated or supine_.
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
## 2021-06-07 ENCOUNTER — HOSPITAL ENCOUNTER (OUTPATIENT)
Dept: PHYSICAL THERAPY | Facility: REHABILITATION | Age: 71
Setting detail: THERAPIES SERIES
Discharge: HOME | End: 2021-06-07
Attending: PHYSICAL MEDICINE & REHABILITATION
Payer: COMMERCIAL

## 2021-06-07 ENCOUNTER — HOSPITAL ENCOUNTER (OUTPATIENT)
Dept: OCCUPATIONAL THERAPY | Facility: REHABILITATION | Age: 71
Setting detail: THERAPIES SERIES
Discharge: HOME | End: 2021-06-07
Attending: PHYSICAL MEDICINE & REHABILITATION
Payer: COMMERCIAL

## 2021-06-07 DIAGNOSIS — S06.0X0S CONCUSSION WITHOUT LOSS OF CONSCIOUSNESS, SEQUELA (CMS/HCC): Primary | ICD-10-CM

## 2021-06-07 DIAGNOSIS — M54.2 CERVICALGIA: ICD-10-CM

## 2021-06-07 DIAGNOSIS — R42 DIZZINESS AND GIDDINESS: ICD-10-CM

## 2021-06-07 PROBLEM — S06.0X0A CONCUSSION WITH NO LOSS OF CONSCIOUSNESS: Status: ACTIVE | Noted: 2021-06-07

## 2021-06-07 PROCEDURE — 97535 SELF CARE MNGMENT TRAINING: CPT | Mod: GO

## 2021-06-07 PROCEDURE — 97112 NEUROMUSCULAR REEDUCATION: CPT | Mod: GP

## 2021-06-07 PROCEDURE — 97010 HOT OR COLD PACKS THERAPY: CPT | Mod: GP

## 2021-06-07 PROCEDURE — 97112 NEUROMUSCULAR REEDUCATION: CPT | Mod: GO

## 2021-06-07 PROCEDURE — 97530 THERAPEUTIC ACTIVITIES: CPT | Mod: GP

## 2021-06-07 NOTE — OP OT TREATMENT LOG
OT Vision/mTBI  EXERCISES CURRENT SESSION TIME   NEURO RE-ED TOTAL TIME FOR SESSION 30   Dynavision 5 sec mode 37/38 ART 1.47, trial 2: 55/55 1.08 with instructions to move her head to find the lights + nausea    VTS3/VTS4    CPT    BITs    NVR    Saccadic Fixation Short saccades in both vertical and horizontal directions tested   King Devick tested    Ocular Motor Control Pt report and demonstration of improved ability to fix ocular position 10 seconds    Visual Tracing    3D Tracking    Visual Perception    Convergence/Divergence    Accommodation    Visual Stimulation Pt reported increased visual stim continues to make her feel nauseous    THER ACT TOTAL TIME FOR SESSION    Pain, Vitals, Meds, Etc.    HEP    Visual Endurance     Work/School Simulation     THER EX TOTAL TIME FOR SESSION    Activity Tolerance     SELF CARE TOTAL TIME FOR SESSION 30   PCS Symptom Management/Education Pt reported working more than 18 hours a week is difficult for her.  She also reported that depending on the task given she may feel more nausea that is if she she has to look at small sale tag print it will increased her symptoms.   ADL/IADLs

## 2021-06-07 NOTE — OP PT TREATMENT LOG
TREATMENT LOG       Precautions: neck, vision       Eval Date:5/3/21 Progress Note:6/7/2021       POC expires: 8/1/21 Units Allowed/Billed:       PT Vestibular Exercises Current Session Time       CANALITH  REPOSITIONING TREATMENT Y/N Notes Not performed                     NEURO RE-ED Y/N Notes 30 minutes       BPPV ASSESSMENT           VOR CANCELLATION                           Standing H/VVOR-C           Ambulation w/ H/VVOR-C           VOR / GAZE STABILITY           *DVA/GST  yes  6/7/2021 completed GST L 98, R 142       Seated H/VVOR no VOR x 60 seconds w/ met:  - HVOR @ 78bpm, no dizziness but 1/10 nausea    -VVOR @ 88, no increase in dizziness, but 1/10 nausea increase (held target in hand with reacher ~ 1 foot out)       Standing H/VVOR no  Standing FA with hands on back of chair 3 feet back from blue board:    -HVOR w/ met @ 78bpm x 60 seconds (2/10 dizziness and nausea which took 2 minutes to recover)  -VVOR w/ met @ 88bpm x 60 seconds (1/10 dizziness and nausea)       Ambulation w/H/VVOR           H/VVOR on compliant surfaces           Functional VOR           HABITUATION           *MSQ   5/18/21: Completed 12.9%       Ball circles           Repetitive functional movements           BALANCE- STATIC           *SOT  5/25/21 Completed composite score 54 (16% below) 2/10 HA and DIzziness       *Lozoya           On floor  no Tandem EO 60 sec each no sway  3/4 tandem arms down EC with mild sway and increased nausea to 2/10       Airex foam  yes      Yes    no - FT EO 60 seconds no sway or increase in dizziness  -FA EC 60 sec with mild sway  - FT EC 20 sec x 4 with mild sway and 2/10 HA and dizziness       Rockerboard           BALANCE- DYNAMIC           FGA  5/18/21: Completed 22/30       Ambulation-head turns/nods  no 2 laps with head turns q 3 steps with cues to visually focus on a target  - HT 2/10 dizziness  - HN 3-4/10 dizziness took 2 minutes to recover       Ambulation - 180 & 360 degree  turns  yes  -Amb fwd/bkwd with 180*turns q 5 steps- 30 ft x 2 in small hallway with mild imbalance and diizziness       Retro ambulation EO           Ambulation with EC           Obstacles           Tandem           Cervical Kinesthesia           THER-EX  Y/N SETS  REPS  LOAD 0 minutes       STRETCHING                 Chin tucks (supine and seated) no 5 sec   1 10  each       Upper Trapezius Stretch  20 sec 1 3         Levator Scapula stretch                 Scalene/SCM stretch                 Doorway Pectoralis stretch                  Open Book/ thread the needle thoracic rotation stretches                                   ROM                 SNAGS for rotation                 SNAGS for extension                 Supine snow angels                 Seated thoracic extension                                   STRENGTHENING                 Cervical Stabilization                 Cervical isometrics (c/s rot, flex, ext, SB, capital flex)                 Resisted chin tucks w/ TB                 Cervical retractions prone on elbows                 Deep cervical flexor strengthening                                   Scapular Stabilization                 Seated scap retraction/depression                 No money (B/L UE ER w/ TB)                 TB rows/overhead rows                 TB shoulder extension                  TB shoulder horiz ABD                 Standing chin tuck with shoulder flexion/abduction                 W to Y’s (supine or standing)                 Prone T, Y, I                  Modified supermans                                   CARDIOVASCULAR           UBE no Level 1, retro, seat 8, 5 minutes        Recumbent bike            Treadmill/BCTT            MANUAL Y/N   Not performed       STM/MFR/TPR           IASTM           Joint Mobilizations                       MODALITIES Y/N   10 min       Heat/Ice  yes                       10 min to cervical spine after GST due to increased cervical pain to 1/10        TENS                   THER ACT Y/N   15 minutes   }    Review pain, meds, falls, vitals, symptoms    y   *Subjective Measures           Patient Education/HEP Encouraged use of metronome with VOR exercises at home and reviewed how to download ani onto phone  Reviewed  static balance exercises in HEP and encouraged compliance.  y
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
## 2021-06-21 ENCOUNTER — HOSPITAL ENCOUNTER (OUTPATIENT)
Dept: OCCUPATIONAL THERAPY | Facility: REHABILITATION | Age: 71
Setting detail: THERAPIES SERIES
Discharge: HOME | End: 2021-06-21
Attending: PHYSICAL MEDICINE & REHABILITATION
Payer: COMMERCIAL

## 2021-06-21 DIAGNOSIS — M54.2 CERVICALGIA: ICD-10-CM

## 2021-06-21 DIAGNOSIS — R42 DIZZINESS AND GIDDINESS: ICD-10-CM

## 2021-06-21 DIAGNOSIS — S06.0X0S CONCUSSION WITHOUT LOSS OF CONSCIOUSNESS, SEQUELA (CMS/HCC): Primary | ICD-10-CM

## 2021-06-21 PROCEDURE — 97112 NEUROMUSCULAR REEDUCATION: CPT | Mod: GO

## 2021-06-21 PROCEDURE — 97530 THERAPEUTIC ACTIVITIES: CPT | Mod: GO

## 2021-06-21 NOTE — OP OT TREATMENT LOG
"OT Vision/mTBI  EXERCISES CURRENT SESSION TIME   NEURO RE-ED TOTAL TIME FOR SESSION 25 minutes   Dynavision    VTS3/VTS4    CPT    BITs    NVR    Saccadic Fixation    Ocular Motor Control    Visual Tracing  attempted Trails Level 1 - pt declined at this time d/t incr sensitivity to visual clutter   3D Tracking    Visual Perception    Convergence/Divergence    Accommodation +scanning 1\" letters on tabletop - copying Lee Chart     Lee Chart Decoding x1 - vc's for 5 corrections required. ALEX remained 1/10, unable to rate nausea/light sensitivity- \"I just don't feel good\"   Visual Stimulation    THER ACT TOTAL TIME FOR SESSION  30 minutes   Pain, Vitals, Meds, Etc.  Monitored, Recorded   HEP Reviewed, Reinforced - strongly reinforced ocular ROM, Trails, saccades - encouraged to perform daily in AM and use compensatory strategies prn ie low-stim room, page blocks to reduce visual clutter/organization demands. Pt verbs challenges with performing d/t full schedule and busy days.  Discussed need to perform regularly for max benefit in recovery process, benefits of establishing routine to include HEP in effort to be more accountable to perform. Will continue to reinforce   Visual Endurance     Work/School Simulation     THER EX TOTAL TIME FOR SESSION    Activity Tolerance     SELF CARE TOTAL TIME FOR SESSION    PCS Symptom Management/Education    ADL/IADLs      "
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
## 2021-06-28 ENCOUNTER — HOSPITAL ENCOUNTER (OUTPATIENT)
Dept: OCCUPATIONAL THERAPY | Facility: REHABILITATION | Age: 71
Setting detail: THERAPIES SERIES
Discharge: HOME | End: 2021-06-28
Attending: PHYSICAL MEDICINE & REHABILITATION
Payer: COMMERCIAL

## 2021-06-28 ENCOUNTER — HOSPITAL ENCOUNTER (OUTPATIENT)
Dept: PHYSICAL THERAPY | Facility: REHABILITATION | Age: 71
Setting detail: THERAPIES SERIES
Discharge: HOME | End: 2021-06-28
Attending: PHYSICAL MEDICINE & REHABILITATION
Payer: COMMERCIAL

## 2021-06-28 DIAGNOSIS — S06.0X0S CONCUSSION WITHOUT LOSS OF CONSCIOUSNESS, SEQUELA (CMS/HCC): Primary | ICD-10-CM

## 2021-06-28 DIAGNOSIS — R42 DIZZINESS AND GIDDINESS: ICD-10-CM

## 2021-06-28 DIAGNOSIS — M54.2 CERVICALGIA: ICD-10-CM

## 2021-06-28 PROCEDURE — 97112 NEUROMUSCULAR REEDUCATION: CPT | Mod: GP

## 2021-06-28 PROCEDURE — 97530 THERAPEUTIC ACTIVITIES: CPT | Mod: GP

## 2021-06-28 PROCEDURE — 97112 NEUROMUSCULAR REEDUCATION: CPT | Mod: GO

## 2021-06-28 PROCEDURE — 97530 THERAPEUTIC ACTIVITIES: CPT | Mod: GO

## 2021-06-28 NOTE — OP OT TREATMENT LOG
"OT Vision/mTBI  EXERCISES CURRENT SESSION TIME   NEURO RE-ED TOTAL TIME FOR SESSION 15 minutes   Dynavision  B5sec: 51 targets, 1.16\"RT   B1.5sec: 50/55, 1.02\"RT   1.25sec, 30%green: 27/36, 0.99\"RT   VTS3/VTS4    CPT    BITs    NVR    Saccadic Fixation    Ocular Motor Control    Visual Tracing     3D Tracking    Visual Perception    Convergence/Divergence    Accommodation  Column Jumps with Lee Chart - bouncing ball between each line.  Mild incr HA/nausea persisted at 1-2/10.   Visual Stimulation    THER ACT TOTAL TIME FOR SESSION  40 minutes   Pain, Vitals, Meds, Etc.  Monitored, Recorded   HEP Reviewed, Reinforced -    Assessments Reassessments for Re-eval completed -see above for detailed findings   Visual Endurance     Work/School Simulation     THER EX TOTAL TIME FOR SESSION    Activity Tolerance     SELF CARE TOTAL TIME FOR SESSION    PCS Symptom Management/Education    ADL/IADLs      "
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
## 2021-06-28 NOTE — PROGRESS NOTES
PT PROGRESS NOTE FOR OUTPATIENT THERAPY    Patient: Ema Black   MRN: [id number]  : 1950 70 y.o.  Referring Physician: Joseph Leach DO  Date of Visit: 2021      Certification Dates: 21 through 21    Recommended Frequency & Duration:  2 times/week for up to 3 months          Diagnosis:   1. Concussion without loss of consciousness, sequela (CMS/[facility])        Chief Complaints:  No chief complaint on file.      Precautions:         TODAY'S VISIT:          PT - 21 0807        Physical Therapy    Physical Therapy  Ortho;Vestibular        PT Plan    Frequency of treatment  2 times/week     PT Duration  3 months     PT Cert From  21     PT Cert To  21     Date PT POC was sent to provider  21     Signed PT Plan of Care received?   Yes         Assessment and Plan - 21 1253        Assessment    Plan of Care reviewed and patient/family in agreement  Yes     System Pathology/Pathophysiology Noted  vestibular;musculoskeletal     Functional Limitations in Following Categories (PT Eval)  home management;work;community/leisure     Rehab Potential/Prognosis  adequate, monitor progress closely     Problem List  dizziness;pain;gaze stabilization;impaired postural control;decreased ROM;motion sensitivity;visual motion intolerance     Clinical Assessment  Ema Black has attended 6 sessions of vestibular physical thepay for her concusison in the past two months.  Currently with working she cannot meet the recommended frequency of twice per week due to her work schedule, but despite that she has had a reported improvement in her overall symptoms.  Her Dizziness Handicap Inventory was 10 points improved, but still percieved moderately dizziness.  Her Activity Specific Balance Confidence scale was 10 points worse, despite scoring significantly better and within normal limits on her Sensory Organization Test.  Her Function Gait Assessment was 2 points worse and 20/30.  Susana  this course of therapy, we have focused mostly on NMRE for her concussion symptoms, and have given brief instruction for her to help manage her neck indpendently at home.  Today her neck pain and headache was 0/10, and explained we would update her cervical  HEP next visit.  Integration of her cervical spine treatment has been limited due to her being unable to attend physical therapy more than once per week.           OBJECTIVE MEASUREMENTS/DATA:    Vestibular    PT Vestibular Evaluation - 06/28/21 0800        Motion Sensitivity    Motion Sensitivity Quotient Percentage (%)  9.7 percent     Motion Sensitivity number of provoking positions  8        Balance Master    SOT Composite Norms  WNL     SOT Composite Score  75     Somatosensory  WNL     Visual  WNL     Vestibular  Below norms    49    Visual Preference  WNL        Balance    FGA Score (out of 30 total)  20        Other Outcome Measures    Activities Balance Confidence   51.9     Dizziness Handicap Inventory Score  44           Today's Treatment::                            TREATMENT LOG           Precautions: neck, vision           Eval Date:5/3/21 Progress Note:6/7/2021           POC expires: 8/1/21 Units Allowed/Billed:           PT Vestibular Exercises Current Session Time           CANALITH  REPOSITIONING TREATMENT Y/N Notes Not performed                             NEURO RE-ED Y/N Notes 50 minutes           BPPV ASSESSMENT               VOR CANCELLATION                               Standing H/VVOR-C               Ambulation w/ H/VVOR-C               VOR / GAZE STABILITY               *DVA/GST   6/7/2021 completed GST L 98, R 142           Seated H/VVOR no VOR x 60 seconds w/ met:  - HVOR @ 78bpm, no dizziness but 1/10 nausea     -VVOR @ 88, no increase in dizziness, but 1/10 nausea increase (held target in hand with reacher ~ 1 foot out)           Standing H/VVOR no  Standing FA with hands on back of chair 3 feet back from blue board:     -HVOR w/ met  @ 78bpm x 60 seconds (2/10 dizziness and nausea which took 2 minutes to recover)  -VVOR w/ met @ 88bpm x 60 seconds (1/10 dizziness and nausea)           Ambulation w/H/VVOR               H/VVOR on compliant surfaces               Functional VOR               HABITUATION               *MSQ  Yes Completed 6/28/21 See vestibular flow sheet for details           Ball circles               Repetitive functional movements               BALANCE- STATIC               *SOT  yes Completed 6/28/21 See vestibular flow sheet for details           *Lozoya               On floor  no Tandem EO 60 sec each no sway  3/4 tandem arms down EC with mild sway and increased nausea to 2/10           Airex foam No to all     no - FT EO 60 seconds no sway or increase in dizziness  -FA EC 60 sec with mild sway  - FT EC 20 sec x 4 with mild sway and 2/10 HA and dizziness           Rockerboard               BALANCE- DYNAMIC               FGA  yes Completed 6/28/21 See vestibular flow sheet for details           Ambulation-head turns/nods  no 2 laps with head turns q 3 steps with cues to visually focus on a target  - HT 2/10 dizziness  - HN 3-4/10 dizziness took 2 minutes to recover           Ambulation - 180 & 360 degree turns no  -Amb fwd/bkwd with 180*turns q 5 steps- 30 ft x 2 in small hallway with mild imbalance and diizziness           Retro ambulation EO               Ambulation with EC               Obstacles               Tandem               Cervical Kinesthesia               THER-EX  Y/N SETS  REPS  LOAD 0 minutes           STRETCHING                     Chin tucks (supine and seated) no 5 sec   1 10  each           Upper Trapezius Stretch   20 sec 1 3             Levator Scapula stretch                     Scalene/SCM stretch                     Doorway Pectoralis stretch                      Open Book/ thread the needle thoracic rotation stretches                                           ROM                     SNAGS for rotation                      SNAGS for extension                     Supine snow angels                     Seated thoracic extension                                           STRENGTHENING                     Cervical Stabilization                     Cervical isometrics (c/s rot, flex, ext, SB, capital flex)                     Resisted chin tucks w/ TB                     Cervical retractions prone on elbows                     Deep cervical flexor strengthening                                           Scapular Stabilization                     Seated scap retraction/depression                     No money (B/L UE ER w/ TB)                     TB rows/overhead rows                     TB shoulder extension                      TB shoulder horiz ABD                     Standing chin tuck with shoulder flexion/abduction                     W to Y’s (supine or standing)                     Prone T, Y, I                      Modified supermans                                           CARDIOVASCULAR               UBE no Level 1, retro, seat 8, 5 minutes           Recumbent bike               Treadmill/BCTT               MANUAL Y/N   Not performed           STM/MFR/TPR               IASTM               Joint Mobilizations                               MODALITIES Y/N   no           Heat/Ice            TENS                           THER ACT Y/N   10 minutes   }     Review pain, meds, falls, vitals, symptoms    y   *Subjective Measures      yes DHI and ABC completed 6/28/21     Patient Education/HEP Encouraged use of metronome with VOR exercises at home and encouraged her to increase her metronome speed by 5-8 beats every 5-7 days as she can tolerate.  Discussed results of testing.  y            Goals Addressed                 This Visit's Progress    • PCS and Cervicalgia        Short Term Goals Time Frame Result Comment/Progress   Patient will decrease Motion Sensitivity Quotient to </=8% for increased functional tolerance.   6  weeks ongoing    Patient will increase Balance Master SOT composite score to WNLs for increased postural control. 6 weeks met    Patient will increase FGA score to TBA for increased gait stability and balance.   6 weeks met    Patient will increase GST scores to >/= 140 for improved gaze stability.   6 weeks ongoing    Patient will perform home exercise program with supervision.   6 weeks ongoing    Patient will demonstrate ability to manage symptoms with <20% cues. 6 weeks ongoing    Patient will be able to tolerate Part time work with no more than 1-2/10 HA increase 6 weeks ongoing    Position testing will be negative for BPPV 6 weeks ongoing    Patient will improve score on ABC to 80% for decreased report of symptoms with daily activities. 6 weeks ongoing    Patient will improve score on DHI to 20 points for decreased report of symptoms with daily activities. 6 weeks ongoing      Short Term Goals Time Frame Result Comment/Progress   Patient will improve NDI to TBA to improve overall function activity 6 weeks ongoing    C/S rotation ROM will improve by 10* bilaterally w/o pain 6 weeks ongoing    C/S extension ROM will improve to 10* w/o pain 6 weeks ongoing    Deep Cervical flexion endurance will improve to 10 seconds 6 weeks ongoing    Patient will demonstrate ability to keep proper posture with <40% cues. 6 weeks ongoing    Patient's cervical pain will be </=2/10 consistently 6 weeks ongoing      Long Term Goals Time Frame Result Comment/Progress   Patient will decrease Motion Sensitivity Quotient to 2 % for increased functional tolerance.   12 weeks ongoing    Patient will increase Balance Master SOT composite score to WNL for increased postural control. 12 weeks met    Patient will increase FGA score to >/=28/30  for increased gait stability and balance.   12 weeks ongoing    Patient will increase GST scores to 180 degrees per second for improved gaze stability.   12 weeks ongoing    Patient will perform home  exercise program independently.   12 weeks ongoing    Patient will tolerate 20 minutes of cardiovascular conditioning at (30-40% max HR).   12 weeks ongoing    Patient will demonstrate independence with managing any residual symptoms. 12 weeks ongoing    Patient will improve score on ABC to 95% for decreased report of symptoms with daily activities. 12 weeks ongoing    Patient will improve score on DHI to </=6 points for decreased report of symptoms with daily activities. 12 weeks ongoing    Patient will have no increased symptoms with challenging VOR activities for symptom free high level activities.   12 weeks ongoing      Long Term Goals Time Frame Result Comment/Progress   Patient will improve NDI to </=10% to improve    12 weeks ongoing    C/S rotation ROM will improve to 80* bilaterally w/o pain 12 weeks ongoing    C/S extension ROM will improve to 50* w/o pain 12 weeks ongoing    Deep Cervical flexion endurance will improve to 20 seconds 12 weeks ongoing    Patient will perform home exercise program with supervision.   12 weeks ongoing    Patient will demonstrate ability to keep proper posture with <20% cues. 12 weeks ongoing    Patient will return to participating in yoga w/o LOB or increase in pain 12 weeks ongoing    Patient's cervical pain will be </=1/10 consistently 12 weeks ongoing

## 2021-06-28 NOTE — OP PT TREATMENT LOG
TREATMENT LOG           Precautions: neck, vision           Eval Date:5/3/21 Progress Note:6/7/2021           POC expires: 8/1/21 Units Allowed/Billed:           PT Vestibular Exercises Current Session Time           CANALITH  REPOSITIONING TREATMENT Y/N Notes Not performed                             NEURO RE-ED Y/N Notes 50 minutes           BPPV ASSESSMENT               VOR CANCELLATION                               Standing H/VVOR-C               Ambulation w/ H/VVOR-C               VOR / GAZE STABILITY               *DVA/GST   6/7/2021 completed GST L 98, R 142           Seated H/VVOR no VOR x 60 seconds w/ met:  - HVOR @ 78bpm, no dizziness but 1/10 nausea     -VVOR @ 88, no increase in dizziness, but 1/10 nausea increase (held target in hand with reacher ~ 1 foot out)           Standing H/VVOR no  Standing FA with hands on back of chair 3 feet back from blue board:     -HVOR w/ met @ 78bpm x 60 seconds (2/10 dizziness and nausea which took 2 minutes to recover)  -VVOR w/ met @ 88bpm x 60 seconds (1/10 dizziness and nausea)           Ambulation w/H/VVOR               H/VVOR on compliant surfaces               Functional VOR               HABITUATION               *MSQ  Yes Completed 6/28/21 See vestibular flow sheet for details           Ball circles               Repetitive functional movements               BALANCE- STATIC               *SOT  yes Completed 6/28/21 See vestibular flow sheet for details           *Lozoya               On floor  no Tandem EO 60 sec each no sway  3/4 tandem arms down EC with mild sway and increased nausea to 2/10           Airex foam No to all     no - FT EO 60 seconds no sway or increase in dizziness  -FA EC 60 sec with mild sway  - FT EC 20 sec x 4 with mild sway and 2/10 HA and dizziness           Rockerboard               BALANCE- DYNAMIC               FGA  yes Completed 6/28/21 See vestibular flow sheet for details           Ambulation-head turns/nods   no 2 laps with head turns q 3 steps with cues to visually focus on a target  - HT 2/10 dizziness  - HN 3-4/10 dizziness took 2 minutes to recover           Ambulation - 180 & 360 degree turns no  -Amb fwd/bkwd with 180*turns q 5 steps- 30 ft x 2 in small hallway with mild imbalance and diizziness           Retro ambulation EO               Ambulation with EC               Obstacles               Tandem               Cervical Kinesthesia               THER-EX  Y/N SETS  REPS  LOAD 0 minutes           STRETCHING                     Chin tucks (supine and seated) no 5 sec   1 10  each           Upper Trapezius Stretch   20 sec 1 3             Levator Scapula stretch                     Scalene/SCM stretch                     Doorway Pectoralis stretch                      Open Book/ thread the needle thoracic rotation stretches                                           ROM                     SNAGS for rotation                     SNAGS for extension                     Supine snow angels                     Seated thoracic extension                                           STRENGTHENING                     Cervical Stabilization                     Cervical isometrics (c/s rot, flex, ext, SB, capital flex)                     Resisted chin tucks w/ TB                     Cervical retractions prone on elbows                     Deep cervical flexor strengthening                                           Scapular Stabilization                     Seated scap retraction/depression                     No money (B/L UE ER w/ TB)                     TB rows/overhead rows                     TB shoulder extension                      TB shoulder horiz ABD                     Standing chin tuck with shoulder flexion/abduction                     W to Y’s (supine or standing)                     Prone T, Y, I                      Modified [facility]                                           CARDIOVASCULAR               UBE no  Level 1, retro, seat 8, 5 minutes           Recumbent bike               Treadmill/BCTT               MANUAL Y/N   Not performed           STM/MFR/TPR               IASTM               Joint Mobilizations                               MODALITIES Y/N   no           Heat/Ice            TENS                           THER ACT Y/N   10 minutes   }     Review pain, meds, falls, vitals, symptoms    y   *Subjective Measures      yes DHI and ABC completed 6/28/21     Patient Education/HEP Encouraged use of metronome with VOR exercises at home and encouraged her to increase her metronome speed by 5-8 beats every 5-7 days as she can tolerate.  Discussed results of testing.  y

## 2021-07-05 ENCOUNTER — HOSPITAL ENCOUNTER (OUTPATIENT)
Dept: PHYSICAL THERAPY | Facility: REHABILITATION | Age: 71
Setting detail: THERAPIES SERIES
Discharge: HOME | End: 2021-07-05
Attending: PHYSICAL MEDICINE & REHABILITATION
Payer: COMMERCIAL

## 2021-07-05 DIAGNOSIS — M54.2 CERVICALGIA: ICD-10-CM

## 2021-07-05 DIAGNOSIS — S06.0X0S CONCUSSION WITHOUT LOSS OF CONSCIOUSNESS, SEQUELA (CMS/HCC): Primary | ICD-10-CM

## 2021-07-05 PROCEDURE — 97112 NEUROMUSCULAR REEDUCATION: CPT | Mod: GP

## 2021-07-05 PROCEDURE — 97530 THERAPEUTIC ACTIVITIES: CPT | Mod: GP
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
## 2021-07-12 ENCOUNTER — HOSPITAL ENCOUNTER (OUTPATIENT)
Dept: PHYSICAL THERAPY | Facility: REHABILITATION | Age: 71
Setting detail: THERAPIES SERIES
Discharge: HOME | End: 2021-07-12
Attending: PHYSICAL MEDICINE & REHABILITATION
Payer: COMMERCIAL

## 2021-07-12 DIAGNOSIS — S06.0X0S CONCUSSION WITHOUT LOSS OF CONSCIOUSNESS, SEQUELA (CMS/HCC): Primary | ICD-10-CM

## 2021-07-12 DIAGNOSIS — M54.2 CERVICALGIA: ICD-10-CM

## 2021-07-12 PROCEDURE — 97530 THERAPEUTIC ACTIVITIES: CPT | Mod: GP

## 2021-07-12 PROCEDURE — 97112 NEUROMUSCULAR REEDUCATION: CPT | Mod: GP
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
## 2021-07-15 ENCOUNTER — HOSPITAL ENCOUNTER (OUTPATIENT)
Dept: OCCUPATIONAL THERAPY | Facility: REHABILITATION | Age: 71
Setting detail: THERAPIES SERIES
Discharge: HOME | End: 2021-07-15
Attending: PHYSICAL MEDICINE & REHABILITATION
Payer: MEDICARE

## 2021-07-15 DIAGNOSIS — S06.0X0S CONCUSSION WITHOUT LOSS OF CONSCIOUSNESS, SEQUELA (CMS/HCC): Primary | ICD-10-CM

## 2021-07-15 PROCEDURE — 97535 SELF CARE MNGMENT TRAINING: CPT | Mod: GO

## 2021-07-15 PROCEDURE — 97112 NEUROMUSCULAR REEDUCATION: CPT | Mod: GO
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
## 2021-07-15 NOTE — OP OT TREATMENT LOG
OT Vision/mTBI  EXERCISES CURRENT SESSION TIME   NEURO RE-ED TOTAL TIME FOR SESSION 30   Dynavision 5 sec mode .80 sec 73/73 score   VTS3/VTS4    CPT    BITs    NVR    Saccadic Fixation [place] making tested and short saccades both vertical and horizontal    Ocular Motor Control Ocular motor control tested and pt reported no symptoms or strain with lateral look gaze stability   Visual Tracing    3D Tracking    Visual Perception    Convergence/Divergence    Accommodation    Visual Stimulation    THER ACT TOTAL TIME FOR SESSION    Pain, Vitals, Meds, Etc.    HEP    Visual Endurance     Work/School Simulation     THER EX TOTAL TIME FOR SESSION    Activity Tolerance     SELF CARE TOTAL TIME FOR SESSION 30   PCS Symptom Management/Education Pt reported improving with symptom management and new job at [employer].  Pt reported new job is easier and more manageable with work duties.  Pt is a  only and does not have other work duties that require frequent visual changes     Pt and therapist discussed importance of continued HEP and trying to push self through the symptoms and continued daily living activities.     ADL/IADLs
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
## 2022-01-01 NOTE — PATIENT INSTRUCTIONS
Cervicalgia:   - did not want to try any muscle relaxant at this time  - continue PT   Basic neck exercises for daily use:    - Neck pathology and poor posture, with straightening of the normal cervical lordosis, can cause headaches.  Tightening of the neck muscles can irritate the nerves in the occipital region of the head and cause or worsen head pain. Thus neck strengthening and relaxation exercises, can help improve this particular pain. It is importance to have good posture for improving shoulder, neck, and head pain.    - Here are some exercises which should take 5 minutes:     1. Standing, drop your head to one side while continuing to look ahead. Hold for 10 seconds then swap sides. Repeat twice more each side. To increase the stretch, drop the opposite shoulder.    2. Standing again, lower your chin to your chest, hold for 10 and then look up to the ceiling and hold for 10. Repeat twice more.     3. Next, standing straight again, look over your right shoulder and hold firm for 10 seconds, then over your left shoulder for 10. Repeat this 3 times.     4. Finally, while sitting upright, bring your head forward and hold for 10, then all the way back and hold for 10.    If this simple exercise does not help improve the posture, we will consider formal physical therapy in the future.       Importance of Healthy Sleep:  Behavioral sleep changes can promote restful, regular sleep and reduce headache. Simple changes like establishing consistent sleep and wake-up times, as well as getting between 7 and 8 hours of sleep a day, can make a world of difference. Experts also recommend avoiding substances that impair sleep, like caffeine, nicotine and alcohol, and also suggest winding down before bed to prevent sleep problems. To read more go to https://americanmigrainefoundation.org/resource-library/sleep/    Medication overuse headaches:   - Medication overuse headache (MOH) and analgesic overuse can negate the  "effectiveness of headache preventive measures.  Avoid medications with narcotics, barbiturates, or caffeine in them as these can cause rebound headaches after very few doses and can interfere with other headache medicine efficacy. Taking  any acute/abortive over the counter medication or prescription drugs for more than 2-3 days a week can cause medication overuse headache.     Chronic tension type headaches  Chronic Migraine type headaches  Preventive therapy for headaches:   -Over-the-counter supplements: to decrease intensity and frequency of migraines  - Magnesium Oxide 400mg a day.  If any diarrhea or upset stomach, decrease dose  as tolerated  - Vitamin B2 200 mg twice a day. May cause the urine to turn yellow which is normal for B 2 to do and is not a sign that you are dehydrated.   - Namenda 10 mg once in am for 2 weeks then then one twice a day after that. This is for headaches and for memory concerns. If this fails consider NP evaluation.   Abortive therapy for headaches:   - may take tylenol one tab but less then 2-3 times a week.       Headache management instructions  - When patient has a moderate to severe headache, they should seek rest, initiate relaxation and apply cold compresses to the head.   - Maintain regular sleep schedule. Adults need at least 7-8 hours of uninterrupted a night.   - Limit over the counter medications such as Tylenol, Ibuprofen, Aleve, Excedrin. (No more than 2- 3 times a week or max 10 a month).  - Maintain headache diary.  Free MANAN for a smart phone, which can be used is \"Migraine prakash\"  - Limit caffeine to 1-2 cups 8 to 16 oz a day or less.  - Avoid dietary trigger. (aged cheese, peanuts, MSG, aspartame and nitrates).  - Patient is to have regular frequent meals to prevent headache onset.    - Please drink at least 64 ounces of water a day to help remain hydrated.      Work up:   -  B-12, vitamin-D,   - TSH,       Cognitive behavioral therapy (CBT) is a common type of talk " therapy (psychotherapy) were you work with a psychotherapist or therapist . CBT helps you become aware of inaccurate or negative thinking so you can view challenging situations more clearly and respond to them in a more effective way. CBT can be a very helpful tool ? either alone or in combination with other therapies ? in treating mental health disorders (depression, post-traumatic stress disorder (PTSD) or an eating disorder) and chronic pain. CBT can be an effective tool to help anyone learn how to better manage stressful life situations and pain. In some cases, CBT is most effective when it's combined with other treatments, such as antidepressants or other medications.  You can start yourself by down loading the ani: [employer]      Mindfulness/Meditation for Treating Migraine  Many people believe that stress is a major trigger for their migraine. This is where mindfulness and meditation can come into play, as they have been known to help reduce migraine severity, duration and acute pain medication use. It may also help to relieve stress and anxiety while improving feelings of well being.    Biofeedback involves becoming more aware of the changes that occur in the body and learning how to exert control over generally involuntary functions. Biofeedback allows you to see your vitals in real-time and learn how to stabilize them on your own. There is great evidence that biofeedback can reduce the frequency, intensity, and duration of migraine and tension-type headache.  When you're stressed, you may notice elevated heart rates, tightened muscles, and sweating.  During biofeedback, you can see these changes on a monitor, then a therapist teaches you exercises to help manage these changes.    Yoga/Jordan Chi for Treating Migraine  The kind of mind/body therapy that yoga can provide may help create relief from migraine. Keeping up with yoga consistently can reduce headache frequency, intensity and duration, so it's important  to practice regularly if you plan to use it as a complementary migraine treatment. However, certain types of yoga such as “hot yoga” may be uncomfortable for people with migraine. Others, such as “restorative yoga,” may be tolerable even for a patient with chronic migraine.  Jordan Chi can also have a similar benefit for patients with migraine. Specifically, it can help improve balance, which can be very useful for those with vestibular symptoms or vestibular migraine.    Acupuncture for Treating Migraine  A traditional Chinese medicine, acupuncture is reported to increase the release of serotonin, dopamine and other chemicals that may help to treat chronic pain, and can be helpful in preventing episodic migraine. There are, however, conflicting results on studies in acupuncture as a treatment for migraine.    Exercising for migraineurs:  Regular exercise can reduce the frequency and intensity of headaches and migraines. When one exercises, the body releases endorphins, which are the body's natural painkillers. Exercise reduces stress and helps individuals to sleep at night. Exercising at least 30 to 40 minutes 3 times a week is sufficient for most patients.   When exercising, follow this plan to prevent headaches:  - First, stay hydrated before, during, and after exercise.    - Second part of the exercise plan is to eat sufficient food about an hour and a half before you exercise. Exercise causes one's blood sugar level to decrease, and it is important to have a source of energy.   - Final part of the exercise plan is to warm-up. Do not jump into sudden, vigorous exercise if that triggers a headache or migraine.   To read more go to https://americanmigrainefoundation.org/resource-library/effects-of-exercise-on-headaches-and-migraines/   -Report redness, swelling or drainage from cord to pediatrician.

## 2022-10-25 NOTE — TELEPHONE ENCOUNTER
called to offer a PT appt 5/11/21 @ 10am.  Pt will call me back to let me know if this is good   Hide Additional Notes?: No Detail Level: Simple